# Patient Record
Sex: MALE | Race: AMERICAN INDIAN OR ALASKA NATIVE | NOT HISPANIC OR LATINO | URBAN - METROPOLITAN AREA
[De-identification: names, ages, dates, MRNs, and addresses within clinical notes are randomized per-mention and may not be internally consistent; named-entity substitution may affect disease eponyms.]

---

## 2017-02-27 ENCOUNTER — HOSPITAL ENCOUNTER (OUTPATIENT)
Dept: RADIOLOGY | Facility: MEDICAL CENTER | Age: 32
End: 2017-02-27

## 2017-02-27 ENCOUNTER — HOSPITAL ENCOUNTER (INPATIENT)
Facility: MEDICAL CENTER | Age: 32
LOS: 3 days | DRG: 133 | End: 2017-03-02
Attending: EMERGENCY MEDICINE | Admitting: INTERNAL MEDICINE
Payer: COMMERCIAL

## 2017-02-27 ENCOUNTER — APPOINTMENT (OUTPATIENT)
Dept: RADIOLOGY | Facility: MEDICAL CENTER | Age: 32
DRG: 133 | End: 2017-02-27
Attending: EMERGENCY MEDICINE
Payer: COMMERCIAL

## 2017-02-27 ENCOUNTER — RESOLUTE PROFESSIONAL BILLING HOSPITAL PROF FEE (OUTPATIENT)
Dept: OTHER | Facility: MEDICAL CENTER | Age: 32
End: 2017-02-27
Payer: COMMERCIAL

## 2017-02-27 DIAGNOSIS — J36 TONSILLAR ABSCESS: ICD-10-CM

## 2017-02-27 LAB
ALBUMIN SERPL BCP-MCNC: 3.4 G/DL (ref 3.2–4.9)
ALBUMIN/GLOB SERPL: 0.8 G/DL
ALP SERPL-CCNC: 84 U/L (ref 30–99)
ALT SERPL-CCNC: 82 U/L (ref 2–50)
ANION GAP SERPL CALC-SCNC: 10 MMOL/L (ref 0–11.9)
APTT PPP: 28.7 SEC (ref 24.7–36)
AST SERPL-CCNC: 35 U/L (ref 12–45)
BASE EXCESS BLDA CALC-SCNC: -4 MMOL/L (ref -4–3)
BASOPHILS # BLD AUTO: 0.1 % (ref 0–1.8)
BASOPHILS # BLD: 0.01 K/UL (ref 0–0.12)
BILIRUB SERPL-MCNC: 1.5 MG/DL (ref 0.1–1.5)
BODY TEMPERATURE: ABNORMAL DEGREES
BUN SERPL-MCNC: 11 MG/DL (ref 8–22)
CALCIUM SERPL-MCNC: 9 MG/DL (ref 8.5–10.5)
CHLORIDE SERPL-SCNC: 103 MMOL/L (ref 96–112)
CO2 BLDA-SCNC: 25 MMOL/L (ref 20–33)
CO2 SERPL-SCNC: 21 MMOL/L (ref 20–33)
CREAT SERPL-MCNC: 0.77 MG/DL (ref 0.5–1.4)
EKG IMPRESSION: NORMAL
EOSINOPHIL # BLD AUTO: 0 K/UL (ref 0–0.51)
EOSINOPHIL NFR BLD: 0 % (ref 0–6.9)
ERYTHROCYTE [DISTWIDTH] IN BLOOD BY AUTOMATED COUNT: 44.7 FL (ref 35.9–50)
GFR SERPL CREATININE-BSD FRML MDRD: >60 ML/MIN/1.73 M 2
GLOBULIN SER CALC-MCNC: 4.4 G/DL (ref 1.9–3.5)
GLUCOSE SERPL-MCNC: 145 MG/DL (ref 65–99)
GRAM STN SPEC: NORMAL
HCO3 BLDA-SCNC: 23.3 MMOL/L (ref 17–25)
HCT VFR BLD AUTO: 42.6 % (ref 42–52)
HGB BLD-MCNC: 13.7 G/DL (ref 14–18)
IMM GRANULOCYTES # BLD AUTO: 0.05 K/UL (ref 0–0.11)
IMM GRANULOCYTES NFR BLD AUTO: 0.5 % (ref 0–0.9)
INR PPP: 1.05 (ref 0.87–1.13)
LACTATE BLD-SCNC: 1.4 MMOL/L (ref 0.5–2)
LACTATE BLD-SCNC: 2.1 MMOL/L (ref 0.5–2)
LYMPHOCYTES # BLD AUTO: 0.76 K/UL (ref 1–4.8)
LYMPHOCYTES NFR BLD: 7.2 % (ref 22–41)
MCH RBC QN AUTO: 29 PG (ref 27–33)
MCHC RBC AUTO-ENTMCNC: 32.2 G/DL (ref 33.7–35.3)
MCV RBC AUTO: 90.1 FL (ref 81.4–97.8)
MONOCYTES # BLD AUTO: 0.2 K/UL (ref 0–0.85)
MONOCYTES NFR BLD AUTO: 1.9 % (ref 0–13.4)
NEUTROPHILS # BLD AUTO: 9.59 K/UL (ref 1.82–7.42)
NEUTROPHILS NFR BLD: 90.3 % (ref 44–72)
NRBC # BLD AUTO: 0 K/UL
NRBC BLD AUTO-RTO: 0 /100 WBC
O2/TOTAL GAS SETTING VFR VENT: 70 %
PCO2 BLDA: 48.6 MMHG (ref 26–37)
PH BLDA: 7.29 [PH] (ref 7.4–7.5)
PLATELET # BLD AUTO: 180 K/UL (ref 164–446)
PMV BLD AUTO: 11.2 FL (ref 9–12.9)
PO2 BLDA: 86 MMHG (ref 64–87)
POTASSIUM SERPL-SCNC: 4 MMOL/L (ref 3.6–5.5)
PROT SERPL-MCNC: 7.8 G/DL (ref 6–8.2)
PROTHROMBIN TIME: 14 SEC (ref 12–14.6)
RBC # BLD AUTO: 4.73 M/UL (ref 4.7–6.1)
SAO2 % BLDA: 95 % (ref 93–99)
SIGNIFICANT IND 70042: NORMAL
SITE SITE: NORMAL
SODIUM SERPL-SCNC: 134 MMOL/L (ref 135–145)
SOURCE SOURCE: NORMAL
SPECIMEN DRAWN FROM PATIENT: ABNORMAL
TRIGL SERPL-MCNC: 84 MG/DL (ref 0–149)
WBC # BLD AUTO: 10.6 K/UL (ref 4.8–10.8)

## 2017-02-27 PROCEDURE — 85610 PROTHROMBIN TIME: CPT

## 2017-02-27 PROCEDURE — 96375 TX/PRO/DX INJ NEW DRUG ADDON: CPT

## 2017-02-27 PROCEDURE — 700111 HCHG RX REV CODE 636 W/ 250 OVERRIDE (IP): Performed by: INTERNAL MEDICINE

## 2017-02-27 PROCEDURE — 700105 HCHG RX REV CODE 258: Performed by: STUDENT IN AN ORGANIZED HEALTH CARE EDUCATION/TRAINING PROGRAM

## 2017-02-27 PROCEDURE — 99291 CRITICAL CARE FIRST HOUR: CPT | Performed by: INTERNAL MEDICINE

## 2017-02-27 PROCEDURE — 36600 WITHDRAWAL OF ARTERIAL BLOOD: CPT

## 2017-02-27 PROCEDURE — 700101 HCHG RX REV CODE 250

## 2017-02-27 PROCEDURE — 85730 THROMBOPLASTIN TIME PARTIAL: CPT

## 2017-02-27 PROCEDURE — 5A1945Z RESPIRATORY VENTILATION, 24-96 CONSECUTIVE HOURS: ICD-10-PCS | Performed by: OTOLARYNGOLOGY

## 2017-02-27 PROCEDURE — 700112 HCHG RX REV CODE 229: Performed by: STUDENT IN AN ORGANIZED HEALTH CARE EDUCATION/TRAINING PROGRAM

## 2017-02-27 PROCEDURE — 94760 N-INVAS EAR/PLS OXIMETRY 1: CPT

## 2017-02-27 PROCEDURE — A9270 NON-COVERED ITEM OR SERVICE: HCPCS | Performed by: STUDENT IN AN ORGANIZED HEALTH CARE EDUCATION/TRAINING PROGRAM

## 2017-02-27 PROCEDURE — 96366 THER/PROPH/DIAG IV INF ADDON: CPT

## 2017-02-27 PROCEDURE — 87070 CULTURE OTHR SPECIMN AEROBIC: CPT

## 2017-02-27 PROCEDURE — 87077 CULTURE AEROBIC IDENTIFY: CPT

## 2017-02-27 PROCEDURE — 94640 AIRWAY INHALATION TREATMENT: CPT

## 2017-02-27 PROCEDURE — 700102 HCHG RX REV CODE 250 W/ 637 OVERRIDE(OP): Performed by: INTERNAL MEDICINE

## 2017-02-27 PROCEDURE — 700111 HCHG RX REV CODE 636 W/ 250 OVERRIDE (IP): Performed by: PHARMACIST

## 2017-02-27 PROCEDURE — 87040 BLOOD CULTURE FOR BACTERIA: CPT

## 2017-02-27 PROCEDURE — 700101 HCHG RX REV CODE 250: Performed by: INTERNAL MEDICINE

## 2017-02-27 PROCEDURE — 93005 ELECTROCARDIOGRAM TRACING: CPT | Performed by: EMERGENCY MEDICINE

## 2017-02-27 PROCEDURE — 770022 HCHG ROOM/CARE - ICU (200)

## 2017-02-27 PROCEDURE — 84478 ASSAY OF TRIGLYCERIDES: CPT

## 2017-02-27 PROCEDURE — 99291 CRITICAL CARE FIRST HOUR: CPT

## 2017-02-27 PROCEDURE — 82803 BLOOD GASES ANY COMBINATION: CPT

## 2017-02-27 PROCEDURE — 700102 HCHG RX REV CODE 250 W/ 637 OVERRIDE(OP): Performed by: STUDENT IN AN ORGANIZED HEALTH CARE EDUCATION/TRAINING PROGRAM

## 2017-02-27 PROCEDURE — 36415 COLL VENOUS BLD VENIPUNCTURE: CPT

## 2017-02-27 PROCEDURE — 87205 SMEAR GRAM STAIN: CPT

## 2017-02-27 PROCEDURE — 85025 COMPLETE CBC W/AUTO DIFF WBC: CPT

## 2017-02-27 PROCEDURE — 0C9P0ZZ DRAINAGE OF TONSILS, OPEN APPROACH: ICD-10-PCS | Performed by: OTOLARYNGOLOGY

## 2017-02-27 PROCEDURE — 80053 COMPREHEN METABOLIC PANEL: CPT

## 2017-02-27 PROCEDURE — 700111 HCHG RX REV CODE 636 W/ 250 OVERRIDE (IP): Performed by: EMERGENCY MEDICINE

## 2017-02-27 PROCEDURE — 71010 DX-CHEST-PORTABLE (1 VIEW): CPT

## 2017-02-27 PROCEDURE — 96365 THER/PROPH/DIAG IV INF INIT: CPT

## 2017-02-27 PROCEDURE — A9270 NON-COVERED ITEM OR SERVICE: HCPCS | Performed by: INTERNAL MEDICINE

## 2017-02-27 PROCEDURE — 700111 HCHG RX REV CODE 636 W/ 250 OVERRIDE (IP): Performed by: STUDENT IN AN ORGANIZED HEALTH CARE EDUCATION/TRAINING PROGRAM

## 2017-02-27 PROCEDURE — 96368 THER/DIAG CONCURRENT INF: CPT

## 2017-02-27 PROCEDURE — 94002 VENT MGMT INPAT INIT DAY: CPT

## 2017-02-27 PROCEDURE — 83605 ASSAY OF LACTIC ACID: CPT

## 2017-02-27 RX ORDER — LIDOCAINE HYDROCHLORIDE AND EPINEPHRINE BITARTRATE 20; .01 MG/ML; MG/ML
INJECTION, SOLUTION SUBCUTANEOUS
Status: COMPLETED
Start: 2017-02-27 | End: 2017-02-27

## 2017-02-27 RX ORDER — BISACODYL 10 MG
10 SUPPOSITORY, RECTAL RECTAL
Status: DISCONTINUED | OUTPATIENT
Start: 2017-02-27 | End: 2017-03-02 | Stop reason: HOSPADM

## 2017-02-27 RX ORDER — SODIUM CHLORIDE 9 MG/ML
INJECTION, SOLUTION INTRAVENOUS CONTINUOUS
Status: DISCONTINUED | OUTPATIENT
Start: 2017-02-27 | End: 2017-03-02 | Stop reason: HOSPADM

## 2017-02-27 RX ORDER — DOCUSATE SODIUM 100 MG/1
100 CAPSULE, LIQUID FILLED ORAL 2 TIMES DAILY
Status: DISCONTINUED | OUTPATIENT
Start: 2017-02-27 | End: 2017-03-02 | Stop reason: HOSPADM

## 2017-02-27 RX ORDER — AMOXICILLIN 250 MG
1 CAPSULE ORAL
Status: DISCONTINUED | OUTPATIENT
Start: 2017-02-27 | End: 2017-03-02 | Stop reason: HOSPADM

## 2017-02-27 RX ORDER — LIDOCAINE HYDROCHLORIDE 10 MG/ML
20 INJECTION, SOLUTION INFILTRATION; PERINEURAL ONCE
Status: COMPLETED | OUTPATIENT
Start: 2017-02-27 | End: 2017-02-27

## 2017-02-27 RX ORDER — HEPARIN SODIUM 5000 [USP'U]/ML
5000 INJECTION, SOLUTION INTRAVENOUS; SUBCUTANEOUS EVERY 8 HOURS
Status: DISCONTINUED | OUTPATIENT
Start: 2017-02-27 | End: 2017-03-02 | Stop reason: HOSPADM

## 2017-02-27 RX ORDER — LIDOCAINE HYDROCHLORIDE 10 MG/ML
1-2 INJECTION, SOLUTION INFILTRATION; PERINEURAL
Status: DISCONTINUED | OUTPATIENT
Start: 2017-02-27 | End: 2017-03-02 | Stop reason: HOSPADM

## 2017-02-27 RX ORDER — LIDOCAINE HYDROCHLORIDE AND EPINEPHRINE BITARTRATE 20; .01 MG/ML; MG/ML
10 INJECTION, SOLUTION SUBCUTANEOUS ONCE
Status: COMPLETED | OUTPATIENT
Start: 2017-02-27 | End: 2017-02-27

## 2017-02-27 RX ORDER — FAMOTIDINE 20 MG/1
20 TABLET, FILM COATED ORAL 2 TIMES DAILY
Status: DISCONTINUED | OUTPATIENT
Start: 2017-02-27 | End: 2017-03-02 | Stop reason: HOSPADM

## 2017-02-27 RX ORDER — ENEMA 19; 7 G/133ML; G/133ML
1 ENEMA RECTAL
Status: DISCONTINUED | OUTPATIENT
Start: 2017-02-27 | End: 2017-02-28

## 2017-02-27 RX ORDER — IPRATROPIUM BROMIDE AND ALBUTEROL SULFATE 2.5; .5 MG/3ML; MG/3ML
3 SOLUTION RESPIRATORY (INHALATION)
Status: DISCONTINUED | OUTPATIENT
Start: 2017-02-27 | End: 2017-02-28

## 2017-02-27 RX ORDER — LACTULOSE 20 G/30ML
30 SOLUTION ORAL
Status: DISCONTINUED | OUTPATIENT
Start: 2017-02-27 | End: 2017-02-28

## 2017-02-27 RX ORDER — CHLORHEXIDINE GLUCONATE ORAL RINSE 1.2 MG/ML
15 SOLUTION DENTAL 2 TIMES DAILY
Status: DISCONTINUED | OUTPATIENT
Start: 2017-02-27 | End: 2017-02-28

## 2017-02-27 RX ORDER — AMOXICILLIN 250 MG
1 CAPSULE ORAL NIGHTLY
Status: DISCONTINUED | OUTPATIENT
Start: 2017-02-27 | End: 2017-03-02 | Stop reason: HOSPADM

## 2017-02-27 RX ORDER — OMEPRAZOLE 20 MG/1
20 CAPSULE, DELAYED RELEASE ORAL DAILY
Status: ON HOLD | COMMUNITY
End: 2017-03-02

## 2017-02-27 RX ORDER — ENEMA 19; 7 G/133ML; G/133ML
1 ENEMA RECTAL
Status: DISCONTINUED | OUTPATIENT
Start: 2017-02-27 | End: 2017-03-02 | Stop reason: HOSPADM

## 2017-02-27 RX ORDER — LACTULOSE 20 G/30ML
30 SOLUTION ORAL
Status: DISCONTINUED | OUTPATIENT
Start: 2017-02-27 | End: 2017-03-02 | Stop reason: HOSPADM

## 2017-02-27 RX ORDER — LABETALOL HYDROCHLORIDE 5 MG/ML
10 INJECTION, SOLUTION INTRAVENOUS EVERY 4 HOURS PRN
Status: DISCONTINUED | OUTPATIENT
Start: 2017-02-27 | End: 2017-02-28

## 2017-02-27 RX ORDER — AMPICILLIN AND SULBACTAM 2; 1 G/1; G/1
3 INJECTION, POWDER, FOR SOLUTION INTRAMUSCULAR; INTRAVENOUS ONCE
Status: COMPLETED | OUTPATIENT
Start: 2017-02-27 | End: 2017-02-27

## 2017-02-27 RX ORDER — IPRATROPIUM BROMIDE AND ALBUTEROL SULFATE 2.5; .5 MG/3ML; MG/3ML
3 SOLUTION RESPIRATORY (INHALATION)
Status: DISCONTINUED | OUTPATIENT
Start: 2017-02-27 | End: 2017-03-02 | Stop reason: HOSPADM

## 2017-02-27 RX ORDER — LIDOCAINE HYDROCHLORIDE AND EPINEPHRINE 10; 10 MG/ML; UG/ML
20 INJECTION, SOLUTION INFILTRATION; PERINEURAL ONCE
Status: DISCONTINUED | OUTPATIENT
Start: 2017-02-27 | End: 2017-02-27

## 2017-02-27 RX ADMIN — STANDARDIZED SENNA CONCENTRATE AND DOCUSATE SODIUM 1 TABLET: 8.6; 5 TABLET, FILM COATED ORAL at 21:49

## 2017-02-27 RX ADMIN — SODIUM CHLORIDE: 9 INJECTION, SOLUTION INTRAVENOUS at 16:12

## 2017-02-27 RX ADMIN — PROPOFOL 70 MCG/KG/MIN: 10 INJECTION, EMULSION INTRAVENOUS at 21:59

## 2017-02-27 RX ADMIN — FENTANYL CITRATE 100 MCG: 50 INJECTION, SOLUTION INTRAMUSCULAR; INTRAVENOUS at 18:12

## 2017-02-27 RX ADMIN — LIDOCAINE HYDROCHLORIDE AND EPINEPHRINE: 20; 10 INJECTION, SOLUTION INFILTRATION; PERINEURAL at 06:15

## 2017-02-27 RX ADMIN — FENTANYL CITRATE 100 MCG: 50 INJECTION, SOLUTION INTRAMUSCULAR; INTRAVENOUS at 06:02

## 2017-02-27 RX ADMIN — DOCUSATE SODIUM 100 MG: 100 CAPSULE ORAL at 21:49

## 2017-02-27 RX ADMIN — FENTANYL CITRATE 100 MCG: 50 INJECTION, SOLUTION INTRAMUSCULAR; INTRAVENOUS at 21:49

## 2017-02-27 RX ADMIN — IPRATROPIUM BROMIDE AND ALBUTEROL SULFATE 3 ML: .5; 3 SOLUTION RESPIRATORY (INHALATION) at 14:21

## 2017-02-27 RX ADMIN — AMPICILLIN SODIUM AND SULBACTAM SODIUM 3 G: 2; 1 INJECTION, POWDER, FOR SOLUTION INTRAMUSCULAR; INTRAVENOUS at 18:12

## 2017-02-27 RX ADMIN — PROPOFOL 70 MCG/KG/MIN: 10 INJECTION, EMULSION INTRAVENOUS at 20:24

## 2017-02-27 RX ADMIN — HEPARIN SODIUM 5000 UNITS: 5000 INJECTION, SOLUTION INTRAVENOUS; SUBCUTANEOUS at 21:49

## 2017-02-27 RX ADMIN — IPRATROPIUM BROMIDE AND ALBUTEROL SULFATE 3 ML: .5; 3 SOLUTION RESPIRATORY (INHALATION) at 22:55

## 2017-02-27 RX ADMIN — CHLORHEXIDINE GLUCONATE 15 ML: 1.2 RINSE ORAL at 21:49

## 2017-02-27 RX ADMIN — FAMOTIDINE 20 MG: 20 TABLET, FILM COATED ORAL at 12:59

## 2017-02-27 RX ADMIN — PROPOFOL 80 MCG/KG/MIN: 10 INJECTION, EMULSION INTRAVENOUS at 08:05

## 2017-02-27 RX ADMIN — PROPOFOL 40 MCG/KG/MIN: 10 INJECTION, EMULSION INTRAVENOUS at 18:22

## 2017-02-27 RX ADMIN — FENTANYL CITRATE 100 MCG: 50 INJECTION, SOLUTION INTRAMUSCULAR; INTRAVENOUS at 19:31

## 2017-02-27 RX ADMIN — AMPICILLIN SODIUM AND SULBACTAM SODIUM 3 G: 2; 1 INJECTION, POWDER, FOR SOLUTION INTRAMUSCULAR; INTRAVENOUS at 12:34

## 2017-02-27 RX ADMIN — SODIUM CHLORIDE: 9 INJECTION, SOLUTION INTRAVENOUS at 08:45

## 2017-02-27 RX ADMIN — PROPOFOL 80 MCG/KG/MIN: 10 INJECTION, EMULSION INTRAVENOUS at 16:10

## 2017-02-27 RX ADMIN — CHLORHEXIDINE GLUCONATE 15 ML: 1.2 RINSE ORAL at 08:45

## 2017-02-27 RX ADMIN — PROPOFOL 70 MCG/KG/MIN: 10 INJECTION, EMULSION INTRAVENOUS at 23:45

## 2017-02-27 RX ADMIN — HEPARIN SODIUM 5000 UNITS: 5000 INJECTION, SOLUTION INTRAVENOUS; SUBCUTANEOUS at 13:05

## 2017-02-27 RX ADMIN — PROPOFOL 20 MCG/KG/MIN: 10 INJECTION, EMULSION INTRAVENOUS at 13:42

## 2017-02-27 RX ADMIN — IPRATROPIUM BROMIDE AND ALBUTEROL SULFATE 3 ML: .5; 3 SOLUTION RESPIRATORY (INHALATION) at 10:51

## 2017-02-27 RX ADMIN — FENTANYL CITRATE 100 MCG: 50 INJECTION, SOLUTION INTRAMUSCULAR; INTRAVENOUS at 08:05

## 2017-02-27 RX ADMIN — PROPOFOL 20 MCG/KG/MIN: 10 INJECTION, EMULSION INTRAVENOUS at 04:51

## 2017-02-27 RX ADMIN — FAMOTIDINE 20 MG: 20 TABLET, FILM COATED ORAL at 21:49

## 2017-02-27 RX ADMIN — PROPOFOL 30 MCG/KG/MIN: 10 INJECTION, EMULSION INTRAVENOUS at 11:30

## 2017-02-27 RX ADMIN — FENTANYL CITRATE 100 MCG: 50 INJECTION, SOLUTION INTRAMUSCULAR; INTRAVENOUS at 16:10

## 2017-02-27 RX ADMIN — IPRATROPIUM BROMIDE AND ALBUTEROL SULFATE 3 ML: .5; 3 SOLUTION RESPIRATORY (INHALATION) at 19:33

## 2017-02-27 RX ADMIN — AMPICILLIN AND SULBACTAM 3 G: 2; 1 INJECTION, POWDER, FOR SOLUTION INTRAMUSCULAR; INTRAVENOUS at 05:16

## 2017-02-27 ASSESSMENT — LIFESTYLE VARIABLES: REASON UNABLE TO ASSESS: INTUBATED, SEDATED

## 2017-02-27 NOTE — IP AVS SNAPSHOT
3/2/2017          Kurt Freeman  [address]  [place] [address]    Dear Kurt:    [facility] wants to ensure your discharge home is safe and you or your loved ones have had all your questions answered regarding your care after you leave the hospital.    You may receive a telephone call within two days of your discharge.  This call is to make certain you understand your discharge instructions as well as ensure we provided you with the best care possible during your stay with us.     The call will only last approximately 3-5 minutes and will be done by a nurse.    Once again, we want to ensure your discharge home is safe and that you have a clear understanding of any next steps in your care.  If you have any questions or concerns, please do not hesitate to contact us, we are here for you.  Thank you for choosing [facility] for your healthcare needs.    Sincerely,    Sathya Perez    [facility]

## 2017-02-27 NOTE — DISCHARGE PLANNING
Medical Social Work    Referral: Critical Patient    Intervention: MSW responded to RD07 for a critical pt.  Pt is Kurt Freeman (: 1985) a 31 year old male brought in by [facility] from [facility] intubated.  Per flight crew pt's mom, Jenelle Rausch ([phone]) is on her way.  Pt's wife, Carmela can be reached at: [phone].  MSW spoke with pt's wife, Carmela who states that she was unable to travel to [place] as they have a family member who was given a few days to a few weeks to live and she has to be home with him.  MSW attempted to contact pt's mother; however, there was no answer and MSW was unable to leave a voice message.     Plan: SW will continue to follow as needed.

## 2017-02-27 NOTE — CONSULTS
DATE OF SERVICE:  02/27/2017    PRINCIPAL COMPLAINT:  Peritonsillar abscess.    HISTORY OF PRESENT ILLNESS:  This is a 31-year-old male who was seen at [facility]   for complaint of several days of pain and difficulty swallowing.  He was   scanned at [facility] where there was concern for retropharyngeal abscess as well   as a peritonsillar abscess.    PAST MEDICAL HISTORY:  At this time is unobtainable secondary to him being   intubated and sedated.    ALLERGIES:  He has no known drug allergies.    PHYSICAL EXAMINATION:  VITAL SIGNS:  He is a large man with big neck, intubated and ventilated.  HEENT:  His ears are within normal limits.  The ear canals are clear with   intact eardrum, no fluid.  The nose has some dried blood and he is intubated   at this time with endotracheal tube secured in place.  The mouth has a lot of   secretions.  Inspection of the mouth, I am actually easily able to see the   oropharynx, which shows no protrusion or abnormality, although he does have a   huge mouth and tongue just given his size.  His tonsils are prominent, but his   posterior pharynx is patent.  I could see the uvula well.  He does have a   little more prominence of the left peritonsillar area than the right.  NECK:  Full, secondary to his weight, but there is no erythema or fluctuance,   I do not feel any discrete lymph nodes, but he is notably overweight.    IMAGING:  CT scan was reviewed that shows enlarged tonsils bilaterally.  He has a   hypoattenuation along the left peritonsillar region.  There is some concern of   some fluid against the level of C1-2, but the CT scan of the retropharyngeal   area looks pretty normal.  There may be some fluid collection at the level of   the just above the thyroid cartilage, but this does not look concerning.  The   CT scan was done with contrast without intubation.    IMPRESSION:  Peritonsillar abscess.  I did inject lidocaine into the left peritonsillar are in the ER, incised and open the  area.  I got very   little pus, but did culture this .  He should be fine on Unasyn.  Most   likely, he can be extubated tomorrow and can go home within the next day or 2.    IMPRESSION:  Peritonsillar abscess, status post incision and drainage.       ____________________________________     MD SERINA Dillon / YANA    DD:  02/27/2017 06:17:45  DT:  02/27/2017 06:42:51    D#:  [id number]  Job#:  [id number]

## 2017-02-27 NOTE — DISCHARGE PLANNING
Care Transition Team Assessment    Information for this assessment was gathered from; this SW, the pt's sister, the bedside RN, and the pt chart. The pt lives in a home in [facility] with his mother and niece. The pt receives medical services through [facility] [employer]. 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 Pt's mother and sister are currently in [place] and will remain here until the pt is discharged from the hospital. Pt sister is the  for transport back to [place]. Pt is not  and does not have any children. Pt was independent with ADLs prior to admission.      Information Source  Orientation : Unable to Assess  Information Given By: Relative  Informant's Name: Dora Freeman  Who is responsible for making decisions for patient? : Patient    Readmission Evaluation  Is this a readmission?: No    Elopement Risk  Legal Hold: No  Ambulatory or Self Mobile in Wheelchair: No-Not an Elopement Risk  Elopement Risk: Not at Risk for Elopement         Discharge Preparedness  What is your plan after discharge?: Home with help  What are your discharge supports?: Parent  Prior Functional Level: Ambulatory, Drives Self, Independent with Activities of Daily Living  Difficulity with ADLs: None  Difficulity with IADLs: None    Functional Assesment  Prior Functional Level: Ambulatory, Drives Self, Independent with Activities of Daily Living    Finances  Financial Barriers to Discharge: No  Prescription Coverage: Yes              Advance Directive  Advance Directive?: None, Clinically incapacitated / Inappropriate         Psychological Assessment  History of Substance Abuse: Alcohol  Date Last Used - Alcohol: weekly  Substance Abuse Comments: Pt is a social drinker. consumes alcohol about 2 times a week.   History of Psychiatric Problems: No  Non-compliant with Treatment: No  Newly Diagnosed Illness: No    Discharge Risks or Barriers  Discharge risks or barriers?:  Transportation, Post-acute placement / services    Anticipated Discharge Information  Anticipated discharge disposition: Home  Discharge Address: [address] [place] [id number]  Discharge Contact Phone Number: [phone] (Sister)

## 2017-02-27 NOTE — PROGRESS NOTES
ICU Gold Team Progress Note               Attending/Resident:   Dr. Leonard/ Michela Date & Time created: 2/27/2017  2:22 PM     Interval History:  30 y/o M with no significant PMHx was transferred from OSH for possible retropharyngeal abscess    2/27:  Intubated at OSH for airway protection for concern of retropharyngeal abscess            CT done at OSH.              ENT Dr. Ann was consulted and patient was found to have      Review of Systems:  Review of Systems   Unable to perform ROS: intubated       Physical Exam:  Physical Exam   Constitutional: He appears well-developed and well-nourished.   Intubated, Sedated.    HENT:   Head: Normocephalic and atraumatic.   Eyes:   Pin point pupil.  Not reactive.    Neck: No JVD present.   Cardiovascular: Normal rate, regular rhythm and intact distal pulses.  Exam reveals friction rub. Exam reveals no gallop.    No murmur heard.  Pulmonary/Chest: Breath sounds normal. No stridor. He has no wheezes. He has no rales.   Intubated   Abdominal: Soft. Bowel sounds are normal. He exhibits no distension. There is no rebound and no guarding.   Musculoskeletal: Normal range of motion. He exhibits no edema or tenderness.   Lymphadenopathy:     He has no cervical adenopathy.   Neurological:   Sedated   Skin: Skin is warm and dry. No rash noted. No erythema. No pallor.       Labs:  Recent Labs      02/27/17   0508   ISTATAPH  7.288*   ISTATAPCO2  48.6*   ISTATAPO2  86   ISTATATCO2  25   FFICEAO8YYS  95   ISTATARTHCO3  23.3   ISTATARTBE  -4   ISTATTEMP  see below   ISTATFIO2  70   ISTATSPEC  Arterial         Recent Labs      02/27/17   0445   SODIUM  134*   POTASSIUM  4.0   CHLORIDE  103   CO2  21   BUN  11   CREATININE  0.77   CALCIUM  9.0     Recent Labs      02/27/17   0445   ALTSGPT  82*   ASTSGOT  35   ALKPHOSPHAT  84   TBILIRUBIN  1.5   GLUCOSE  145*     Recent Labs      02/27/17   0445   RBC  4.73   HEMOGLOBIN  13.7*   HEMATOCRIT  42.6   PLATELETCT  180   PROTHROMBTM  14.0    APTT  28.7   INR  1.05     Recent Labs      17   0445   WBC  10.6   NEUTSPOLYS  90.30*   LYMPHOCYTES  7.20*   MONOCYTES  1.90   EOSINOPHILS  0.00   BASOPHILS  0.10   ASTSGOT  35   ALTSGPT  82*   ALKPHOSPHAT  84   TBILIRUBIN  1.5           Hemodynamics:  Temp (24hrs), Av.4 °C (97.5 °F), Min:36 °C (96.8 °F), Max:36.9 °C (98.5 °F)  Temperature: 36 °C (96.8 °F)  Pulse  Av.9  Min: 67  Max: 86Heart Rate (Monitored): 81  Blood Pressure: 124/69 mmHg, NIBP: (!) 97/54 mmHg     Respiratory:  Melo Vent Mode: APVCMV, Rate (breaths/min): 18, PEEP/CPAP: 8, FiO2: 50, P Peak (PIP): 24, P MEAN: 12 Respiration: (!) 123, Pulse Oximetry: 99 %     Work Of Breathing / Effort: Vented  RUL Breath Sounds: Fine Crackles, RML Breath Sounds: Diminished;Fine Crackles, RLL Breath Sounds: Diminished, SOULEYMANE Breath Sounds: Fine Crackles, LLL Breath Sounds: Diminished  Fluids:    Intake/Output Summary (Last 24 hours) at 17 1422  Last data filed at 17 1400   Gross per 24 hour   Intake 1135.55 ml   Output   1325 ml   Net -189.45 ml     Weight: (!) 145.8 kg (321 lb 6.9 oz)  GI/Nutrition:  Orders Placed This Encounter   Procedures   • Diet NPO     Standing Status: Standing      Number of Occurrences: 1      Standing Expiration Date:      Order Specific Question:  Type:     Answer:  Now [1]     Order Specific Question:  Restrict to:     Answer:  Strict [1]     Medical Decision Making, by Problem:  Active Hospital Problems    Diagnosis   • Tonsillar abscess [J36]      Tonsillar Abscess  -Transferred from OSH with intubated  -ENT on board.  S/p ID of tonsillar abscess  -Cx pending  -Lactic acid trending down 2.1-> 1.4. Gram stain: Gram positive cocci  -Bcx pending  -Con't Unasyn.    -Con't Vent  -Will plan to extubate  -CTM    Respiratory   -On Ventilation for airway protection   -Con't Ventilation and sedation  - Plan to extubate tomorrow    ETOH use  -Sedated.   -Will monitor for withdrawal    Transaminitis  -AST 35, ALT  82  -pattern not c/w ETOH use  -?Viral hepatitis vs. NAFLD        Labs reviewed, Radiology images reviewed and Medications reviewed  Armendariz catheter: Critically Ill - Requiring Accurate Measurement of Urinary Output        DVT prophylaxis - mechanical: SCDs

## 2017-02-27 NOTE — ED PROVIDER NOTES
"ED Provider Note    ED Provider Note        CHIEF COMPLAINT  Chief Complaint   Patient presents with   • Abscess   • Airway Obstruction       HPI  Kurt Freeman is a 31 y.o. male who presents to the Emergency Department transferred from [facility] with chief concern of peritonsillar abscess and retropharyngeal abscess. Patient presented to the outside hospital with difficulty swallowing and increased throat pain. Chills and fever as reported by outside physician. CT scan at the outside hospital was concerning for 2.5 cm left-sided peritonsillar abscess as well as a 0.8 cm retropharyngeal abscess from C2-C4. Patient was given IV Decadron and clindamycin at the outside hospital. With concerns of airway compromise and long transport patient was intubated for airway protection. He arrives here intubated and fully sedated further history of present illness limited by presentation.    REVIEW OF SYSTEMS  As above otherwise limited by presentation intubation and sedation    PAST MEDICAL HISTORY    unable to obtain    SURGICAL HISTORY  Unable to obtain    SOCIAL HISTORY  Unable to obtain      FAMILY HISTORY  Non-Contributory    CURRENT MEDICATIONS  Home Medications     Reviewed by Ezequile Alarcon (Pharmacy Tech) on 02/27/17 at 0734  Med List Status: Complete    Medication Last Dose Status    omeprazole (PRILOSEC) 20 MG delayed-release capsule 2/25/2017 Active                ALLERGIES  No Known Allergies    PHYSICAL EXAM  VITAL SIGNS: /69 mmHg  Pulse 84  Temp(Src) 36.9 °C (98.5 °F)  Resp 19  Ht 1.88 m (6' 2\")  Wt 144.697 kg (319 lb)  BMI 40.94 kg/m2  SpO2 96%  Pulse ox interpretation: I interpret this pulse ox as normal.  Constitutional: Intubated and sedated  HEENT: Atraumatic normocephalic, pupils are equal round reactive to light  The nares is clear, external ears are normal, mouth shows moist mucous membranes endotracheal tube in place  Neck: Morbidly obese palpable left-sided " cervical lymphadenopathy  Cardiovascular: Regular rate and rhythm no murmur rub or gallop 2+ pulses peripherally x4  Thorax & Lungs: Mechanically ventilated 9 min tissue sounds appreciable over mechanical ventilation.   GI: Morbidly obese nondistended positive bowel sounds  Skin: Warm dry no acute rash or lesion  Musculoskeletal: no acute  deformity  Neurologic: Intubated and sedated  Psychiatric: Intubated and sedated      DIAGNOSTIC STUDIES / PROCEDURES  LABS  Results for orders placed or performed during the hospital encounter of 02/27/17   CBC WITH DIFFERENTIAL   Result Value Ref Range    WBC 10.6 4.8 - 10.8 K/uL    RBC 4.73 4.70 - 6.10 M/uL    Hemoglobin 13.7 (L) 14.0 - 18.0 g/dL    Hematocrit 42.6 42.0 - 52.0 %    MCV 90.1 81.4 - 97.8 fL    MCH 29.0 27.0 - 33.0 pg    MCHC 32.2 (L) 33.7 - 35.3 g/dL    RDW 44.7 35.9 - 50.0 fL    Platelet Count 180 164 - 446 K/uL    MPV 11.2 9.0 - 12.9 fL    Neutrophils-Polys 90.30 (H) 44.00 - 72.00 %    Lymphocytes 7.20 (L) 22.00 - 41.00 %    Monocytes 1.90 0.00 - 13.40 %    Eosinophils 0.00 0.00 - 6.90 %    Basophils 0.10 0.00 - 1.80 %    Immature Granulocytes 0.50 0.00 - 0.90 %    Nucleated RBC 0.00 /100 WBC    Neutrophils (Absolute) 9.59 (H) 1.82 - 7.42 K/uL    Lymphs (Absolute) 0.76 (L) 1.00 - 4.80 K/uL    Monos (Absolute) 0.20 0.00 - 0.85 K/uL    Eos (Absolute) 0.00 0.00 - 0.51 K/uL    Baso (Absolute) 0.01 0.00 - 0.12 K/uL    Immature Granulocytes (abs) 0.05 0.00 - 0.11 K/uL    NRBC (Absolute) 0.00 K/uL   COMP METABOLIC PANEL   Result Value Ref Range    Sodium 134 (L) 135 - 145 mmol/L    Potassium 4.0 3.6 - 5.5 mmol/L    Chloride 103 96 - 112 mmol/L    Co2 21 20 - 33 mmol/L    Anion Gap 10.0 0.0 - 11.9    Glucose 145 (H) 65 - 99 mg/dL    Bun 11 8 - 22 mg/dL    Creatinine 0.77 0.50 - 1.40 mg/dL    Calcium 9.0 8.5 - 10.5 mg/dL    AST(SGOT) 35 12 - 45 U/L    ALT(SGPT) 82 (H) 2 - 50 U/L    Alkaline Phosphatase 84 30 - 99 U/L    Total Bilirubin 1.5 0.1 - 1.5 mg/dL    Albumin 3.4  3.2 - 4.9 g/dL    Total Protein 7.8 6.0 - 8.2 g/dL    Globulin 4.4 (H) 1.9 - 3.5 g/dL    A-G Ratio 0.8 g/dL   APTT   Result Value Ref Range    APTT 28.7 24.7 - 36.0 sec   PROTHROMBIN TIME   Result Value Ref Range    PT 14.0 12.0 - 14.6 sec    INR 1.05 0.87 - 1.13   LACTIC ACID   Result Value Ref Range    Lactic Acid 2.1 (H) 0.5 - 2.0 mmol/L   TRIGLYCERIDE   Result Value Ref Range    Triglycerides 84 0 - 149 mg/dL   ESTIMATED GFR   Result Value Ref Range    GFR If African American >60 >60 mL/min/1.73 m 2    GFR If Non African American >60 >60 mL/min/1.73 m 2   EKG (NOW)   Result Value Ref Range    Report       [facility] Emergency Dept.    Test Date:  2017  Pt Name:    BISHOP UMAÑA                 Department: ER  MRN:        [id number]                      Room:       [facility]  Gender:     M                            Technician: [id number]  :        1985                   Requested By:TORO RAPP  Order #:    [id number]                    Reading MD:    Measurements  Intervals                                Axis  Rate:       80                           P:          16  GA:         152                          QRS:        48  QRSD:       88                           T:          18  QT:         396  QTc:        457    Interpretive Statements  SINUS RHYTHM  No previous ECG available for comparison     ISTAT ARTERIAL BLOOD GAS   Result Value Ref Range    Ph 7.288 (LL) 7.400 - 7.500    Pco2 48.6 (H) 26.0 - 37.0 mmHg    Po2 86 64 - 87 mmHg    Tco2 25 20 - 33 mmol/L    S02 95 93 - 99 %    Hco3 23.3 17.0 - 25.0 mmol/L    BE -4 -4 - 3 mmol/L    Body Temp see below degrees    O2 Therapy 70 %    Specimen Arterial        All labs reviewed by me.        RADIOLOGY  DX-CHEST-PORTABLE (1 VIEW)   Final Result      1.  ETT tip projects in satisfactory position.      2.  Mild central pulmonary vascular congestion.      3.  No lobar consolidation or pneumothorax.      CT-FOREIGN FILM CAT SCAN   Preliminary  Result      OUTSIDE IMAGES-DX CHEST   Preliminary Result        The radiologist's interpretation of all radiological studies have been reviewed by me.    COURSE & MEDICAL DECISION MAKING  Pertinent Labs & Imaging studies reviewed. (See chart for details)        Medical Decision Makin-year-old male transferred also for concerns of peritonsillar abscess and retropharyngeal abscess properly covered with antibiotics prior to arrival. This was broadened with Unasyn at arrival here chest x-ray shows ET tube in appropriate position other acute cardiopulmonary process. Repeat labs were sent as above. Patient was placed on propofol drip. Does have minimal respiratory acidosis and minimal lactic acidosis. Labs otherwise fairly unremarkable. Ear nose and throat surgeon Dr. Ann was consulted. Her help is greatly appreciated G was quickly at the patient's bedside and drained the peritonsillar abscess. After review of CAT scan she is not concern for retropharyngeal abscess. Patient will be admitted to the ICU for further evaluation and treatment admitted in guarded condition      FINAL IMPRESSION  1. Tonsillar abscess     2. Respiratory compromise  3. Respiratory acidosis  2. Lactic acidosis      This dictation has been created using voice recognition software and/or scribes. The accuracy of the dictation is limited by the abilities of the software and the expertise of the scribes. I expect there may be some errors of grammar and possibly content. I made every attempt to manually correct the errors within my dictation. However, errors related to voice recognition software and/or scribes may still exist and should be interpreted within the appropriate context.

## 2017-02-27 NOTE — CARE PLAN
Problem: Respiratory:  Goal: Respiratory status will improve  HOB>30 degrees. Oral care provided Q4 hours. RT actively titrating down FiO2. Collaborating with RT to ensure optimal oxygenation.    Problem: Skin Integrity  Goal: Risk for impaired skin integrity will decrease  Alex score 12. Pillows in place for support and positioning. Q2 hour turns in progress. Head-to-toe skin assessment completed. No new s/sx of skin breakdown.

## 2017-02-27 NOTE — ED NOTES
Med Rec completed per family at bedside  Allergies reviewed  No ORAL antibiotics in last 30 days

## 2017-02-27 NOTE — IP AVS SNAPSHOT
" <p align=\"LEFT\"><IMG SRC=\"//[email or web address]\" alt=\"Image\" WIDTH=\"50%\" HEIGHT=\"200\" BORDER=\"\"></p>                   Name:Kurt Freeman  Medical Record Number:[id number]  CSN: [id number]    YOB: 1985   Age: 31 y.o.  Sex: male  HT:1.88 m (6' 2\") WT: 145.8 kg (321 lb 6.9 oz)          Admit Date: 2/27/2017     Discharge Date:   Today's Date: 3/2/2017  Attending Doctor:  Unr Purple Team Bronander                  Allergies:  Review of patient's allergies indicates no known allergies.          Follow-up Information     1. Schedule an appointment as soon as possible for a visit with Ashley Ann M.D..    Specialty:  Otolaryngology    Contact information    [address] [place] [id number]  [phone]           Medication List      Take these Medications        Instructions    amoxicillin-clavulanate 875-125 MG Tabs   Commonly known as:  AUGMENTIN    Take 1 Tab by mouth 2 times a day for 7 days.   Dose:  1 Tab       ibuprofen 600 MG Tabs   Commonly known as:  MOTRIN    Take 1 Tab by mouth every 6 hours as needed for Mild Pain or Moderate Pain.   Dose:  600 mg       oxycodone immediate-release 5 MG Tabs   Commonly known as:  ROXICODONE    Take 1 Tab by mouth every 6 hours as needed for Severe Pain for up to 3 days.   Dose:  5 mg         "

## 2017-02-27 NOTE — DISCHARGE PLANNING
Generated Work Excuse Letter for pt's sister. Placed copy in pt chart. Paced letter on pt chart. Sister will  letter later on today.

## 2017-02-27 NOTE — ED NOTES
Assumed pt care. Pt restless on gurney. Pts sedation increased. Family updated on POC. Awaiting ICU

## 2017-02-27 NOTE — DIETARY
NUTRITION - Order received for Metabolic Cart Study per Dietitian/RT. Study not indicated at this time. Dietitian/RT to order as needed.

## 2017-02-27 NOTE — IP AVS SNAPSHOT
" Home Care Instructions                                                                                                                  Name:Kurt Freeman  Medical Record Number:[id number]  CSN: [id number]    YOB: 1985   Age: 31 y.o.  Sex: male  HT:1.88 m (6' 2\") WT: 145.8 kg (321 lb 6.9 oz)          Admit Date: 2/27/2017     Discharge Date:   Today's Date: 3/2/2017  Attending Doctor:  Unr Purple Team Bronander                  Allergies:  Review of patient's allergies indicates no known allergies.            Discharge Instructions       Discharge Instructions    Discharged to home by car with relative. Discharged via wheelchair, hospital escort: Yes.  Special equipment needed: Not Applicable    Be sure to schedule a follow-up appointment with your primary care doctor or any specialists as instructed.     Discharge Plan:   Diet Plan: Discussed  Activity Level: Discussed  Smoking Cessation Offered: Patient Counseled  Confirmed Follow up Appointment: Patient to Call and Schedule Appointment  Confirmed Symptoms Management: Discussed  Medication Reconciliation Updated: Yes  Influenza Vaccine Indication: Patient Refuses    I understand that a diet low in cholesterol, fat, and sodium is recommended for good health. Unless I have been given specific instructions below for another diet, I accept this instruction as my diet prescription.   Other diet: regular diet    Special Instructions: None    · Is patient discharged on Warfarin / Coumadin?   No     · Is patient Post Blood Transfusion?  No    Depression / Suicide Risk    As you are discharged from this [facility], it is important to learn how to keep safe from harming yourself.    Recognize the warning signs:  · Abrupt changes in personality, positive or negative- including increase in energy   · Giving away possessions  · Change in eating patterns- significant weight changes-  positive or negative  · Change in sleeping patterns- unable to sleep " or sleeping all the time   · Unwillingness or inability to communicate  · Depression  · Unusual sadness, discouragement and loneliness  · Talk of wanting to die  · Neglect of personal appearance   · Rebelliousness- reckless behavior  · Withdrawal from people/activities they love  · Confusion- inability to concentrate     If you or a loved one observes any of these behaviors or has concerns about self-harm, here's what you can do:  · Talk about it- your feelings and reasons for harming yourself  · Remove any means that you might use to hurt yourself (examples: pills, rope, extension cords, firearm)  · Get professional help from the community (Mental Health, Substance Abuse, psychological counseling)  · Do not be alone:Call your Safe Contact- someone whom you trust who will be there for you.  · Call your local CRISIS HOTLINE 676-1504 or 175-013-2340  · Call your local Children's Mobile Crisis Response Team [place] (597) 347-2285 or www.[employer]  · Call the toll free National Suicide Prevention Hotlines   · National Suicide Prevention Lifeline 649-370-CLDK (3486)  · National Hope Line Network 800-SUICIDE (730-9251)        Follow-up Information     1. Schedule an appointment as soon as possible for a visit with Ashley Ann M.D..    Specialty:  Otolaryngology    Contact information    [address] [place] [id number]  [phone]           Discharge Medication Instructions:    Below are the medications your physician expects you to take upon discharge:    Review all your home medications and newly ordered medications with your doctor and/or pharmacist. Follow medication instructions as directed by your doctor and/or pharmacist.    Please keep your medication list with you and share with your physician.               Medication List      START taking these medications        Instructions    amoxicillin-clavulanate 875-125 MG Tabs   Commonly known as:  AUGMENTIN    Take 1 Tab by mouth 2 times a day for 7  days.   Dose:  1 Tab       ibuprofen 600 MG Tabs   Commonly known as:  MOTRIN    Take 1 Tab by mouth every 6 hours as needed for Mild Pain or Moderate Pain.   Dose:  600 mg       oxycodone immediate-release 5 MG Tabs   Last time this was given:  5 mg on 3/2/2017  5:43 AM   Commonly known as:  ROXICODONE    Take 1 Tab by mouth every 6 hours as needed for Severe Pain for up to 3 days.   Dose:  5 mg         STOP taking these medications     omeprazole 20 MG delayed-release capsule   Commonly known as:  PRILOSEC               Instructions           Diet / Nutrition:    Follow any diet instructions given to you by your doctor or the dietician, including how much salt (sodium) you are allowed each day.    If you are overweight, talk to your doctor about a weight reduction plan.    Activity:    Remain physically active following your doctor's instructions about exercise and activity.    Rest often.     Any time you become even a little tired or short of breath, SIT DOWN and rest.    Worsening Symptoms:    Report any of the following signs and symptoms to the doctor's office immediately:    *Pain of jaw, arm, or neck  *Chest pain not relieved by medication                               *Dizziness or loss of consciousness  *Difficulty breathing even when at rest   *More tired than usual                                       *Bleeding drainage or swelling of surgical site  *Swelling of feet, ankles, legs or stomach                 *Fever (>100ºF)  *Pink or blood tinged sputum  *Weight gain (3lbs/day or 5lbs /week)           *Shock from internal defibrillator (if applicable)  *Palpitations or irregular heartbeats                *Cool and/or numb extremities    Stroke Awareness    Common Risk Factors for Stroke include:    Age  Atrial Fibrillation  Carotid Artery Stenosis  Diabetes Mellitus  Excessive alcohol consumption  High blood pressure  Overweight   Physical inactivity  Smoking    Warning signs and symptoms of a stroke  include:    *Sudden numbness or weakness of the face, arm or leg (especially on one side of the body).  *Sudden confusion, trouble speaking or understanding.  *Sudden trouble seeing in one or both eyes.  *Sudden trouble walking, dizziness, loss of balance or coordination.Sudden severe headache with no known cause.    It is very important to get treatment quickly when a stroke occurs. If you experience any of the above warning signs, call 911 immediately.                   Disclaimer         Quit Smoking / Tobacco Use:    I understand the use of any tobacco products increases my chance of suffering from future heart disease or stroke and could cause other illnesses which may shorten my life. Quitting the use of tobacco products is the single most important thing I can do to improve my health. For further information on smoking / tobacco cessation call a Toll Free Quit Line at 1-362.786.1458 (*National Cancer [place]) or 1-273.867.3239 (American Lung Association) or you can access the web based program at www.lungMunetrix.org.    [place] Tobacco Users Help Line:  (476) 566-1962       Toll Free: 1-878.520.8407  Quit Tobacco Program [facility] (284)530-8436    Crisis Hotline:    [place] Crisis Hotline:  2-048-TIQHQIK or 1-996.553.2552    [place] Crisis Hotline:    1-331.430.3768 or 665-696-4961    Discharge Survey:   Thank you for choosing [facility]. We hope we did everything we could to make your hospital stay a pleasant one. You may be receiving a phone survey and we would appreciate your time and participation in answering the questions. Your input is very valuable to us in our efforts to improve our service to our patients and their families.        My signature on this form indicates that:    1. I have reviewed and understand the above information.  2. My questions regarding this information have been answered to my satisfaction.  3. I have formulated a plan with my discharge nurse to obtain  my prescribed medications for home.                  Disclaimer         __________________________________                     __________       ________                       Patient Signature                                                 Date                    Time

## 2017-02-27 NOTE — H&P
[facility] INTERNAL MEDICINE HISTORY/ PHYSICAL:    Murray Sandhu  Date & Time note created:    2/27/2017   6:26 AM       Patient ID:   Name:             Kurt Freeman   YOB: 1985  Age:                 31 y.o.  male   MRN:               [id number]    Admitting Senior (R2/R3):  Dr. Sandhu   Admitting Attending/Team Color:  Dr. Leonard  ###############################################################    Chief Complaint:      Transferred from outside facility (Care flight)  Peritonsillar, retropharyngeal abscess     History of Present Illness:   Mr. Freeman is a 31 year old M with no significant past medical history was transferred from outside facility after presenting with sore throat at outside facility. As per outside records, his symptoms started over the weekend when he noticed some pain with swallowing a tylenol pill which progressed to worsening swallowing, voice changes and mild pain to the L side of the neck with range of motion. He was seen at the outside facility where a CT reportedly showed 2.5cm peritonsilar abscess with moderate retropharyngeal abscess 8 mm in thickness extending from C2-C4. He initially left the hospital AMA but returned an hour later to procede with medical management. He was given Clindamycin and decadron and intubated for airway protection prior to care flight to [facility]. He arrived to [facility] intubated and sedated on propofol. History was collected from the patient's chart. Outside labs showed: WBC: 16.5 (59N), LA: 1.5, CRP: 13.10 and mild transaminitis.     Review of Systems:      Unable to obtain 2/2 intubated, sedated.     Past Medical/ Family / Social history (PFSH):        Unable to obtain 2/2 intubated, sedated.     Current Outpatient Medications:  Home Medications     Reviewed by Lizabeth Hurst R.N. (Registered Nurse) on 02/27/17 at 6705  Med List Status: Unable to Obtain    Medication Last Dose Status          Patient Gorge Taking any Medications                   "      Medication Allergy/Sensitivities:  No Known Allergies      Family History:  Unable to obtain 2/2 intubated, sedated.     Social History:  Unable to obtain 2/2 intubated, sedated.              #################################################################  Physical Exam:  Vitals/ General Appearance:   Weight/BMI: Body mass index is 40.94 kg/(m^2).  /69 mmHg  Pulse 80  Temp(Src) 36.9 °C (98.5 °F)  Resp 16  Ht 1.88 m (6' 2\")  Wt 144.697 kg (319 lb)  BMI 40.94 kg/m2  SpO2 98%  Filed Vitals:    02/27/17 0508 02/27/17 0529 02/27/17 0530 02/27/17 0600   BP:       Pulse:   80    Temp:  36.9 °C (98.5 °F)     Resp:   14 16   Height:       Weight:       SpO2: 93%  98%      Oxygen Therapy:  Pulse Oximetry: 98 %, O2 Delivery: Ventilator    General: vented, sedated.   HEENT: EOMI. Scleral clear.  CVS: normal S1, S2. NSR. No m/r/g. No JVD.   PULM: CTABL . No w/r/r. No basilar crackles.   ABD: soft, NT, ND. +BS.   : +montgomery.    EXT: no LE edema b/l. No cyanosis.  Neuro: Vented, sedated.   Skin: mild LE abrasions.       #################################################################  [facility] (Data Review):     (yes) Old Records Requested  (yes) Records reviewed and summarized in current documentation    Lab Data Review:  Recent Results (from the past 24 hour(s))   CBC WITH DIFFERENTIAL    Collection Time: 02/27/17  4:45 AM   Result Value Ref Range    WBC 10.6 4.8 - 10.8 K/uL    RBC 4.73 4.70 - 6.10 M/uL    Hemoglobin 13.7 (L) 14.0 - 18.0 g/dL    Hematocrit 42.6 42.0 - 52.0 %    MCV 90.1 81.4 - 97.8 fL    MCH 29.0 27.0 - 33.0 pg    MCHC 32.2 (L) 33.7 - 35.3 g/dL    RDW 44.7 35.9 - 50.0 fL    Platelet Count 180 164 - 446 K/uL    MPV 11.2 9.0 - 12.9 fL    Neutrophils-Polys 90.30 (H) 44.00 - 72.00 %    Lymphocytes 7.20 (L) 22.00 - 41.00 %    Monocytes 1.90 0.00 - 13.40 %    Eosinophils 0.00 0.00 - 6.90 %    Basophils 0.10 0.00 - 1.80 %    Immature Granulocytes 0.50 0.00 - 0.90 %    Nucleated RBC 0.00 /100 WBC    " Neutrophils (Absolute) 9.59 (H) 1.82 - 7.42 K/uL    Lymphs (Absolute) 0.76 (L) 1.00 - 4.80 K/uL    Monos (Absolute) 0.20 0.00 - 0.85 K/uL    Eos (Absolute) 0.00 0.00 - 0.51 K/uL    Baso (Absolute) 0.01 0.00 - 0.12 K/uL    Immature Granulocytes (abs) 0.05 0.00 - 0.11 K/uL    NRBC (Absolute) 0.00 K/uL   COMP METABOLIC PANEL    Collection Time: 02/27/17  4:45 AM   Result Value Ref Range    Sodium 134 (L) 135 - 145 mmol/L    Potassium 4.0 3.6 - 5.5 mmol/L    Chloride 103 96 - 112 mmol/L    Co2 21 20 - 33 mmol/L    Anion Gap 10.0 0.0 - 11.9    Glucose 145 (H) 65 - 99 mg/dL    Bun 11 8 - 22 mg/dL    Creatinine 0.77 0.50 - 1.40 mg/dL    Calcium 9.0 8.5 - 10.5 mg/dL    AST(SGOT) 35 12 - 45 U/L    ALT(SGPT) 82 (H) 2 - 50 U/L    Alkaline Phosphatase 84 30 - 99 U/L    Total Bilirubin 1.5 0.1 - 1.5 mg/dL    Albumin 3.4 3.2 - 4.9 g/dL    Total Protein 7.8 6.0 - 8.2 g/dL    Globulin 4.4 (H) 1.9 - 3.5 g/dL    A-G Ratio 0.8 g/dL   APTT    Collection Time: 02/27/17  4:45 AM   Result Value Ref Range    APTT 28.7 24.7 - 36.0 sec   PROTHROMBIN TIME    Collection Time: 02/27/17  4:45 AM   Result Value Ref Range    PT 14.0 12.0 - 14.6 sec    INR 1.05 0.87 - 1.13   LACTIC ACID    Collection Time: 02/27/17  4:45 AM   Result Value Ref Range    Lactic Acid 2.1 (H) 0.5 - 2.0 mmol/L   TRIGLYCERIDE    Collection Time: 02/27/17  4:45 AM   Result Value Ref Range    Triglycerides 84 0 - 149 mg/dL   ESTIMATED GFR    Collection Time: 02/27/17  4:45 AM   Result Value Ref Range    GFR If African American >60 >60 mL/min/1.73 m 2    GFR If Non African American >60 >60 mL/min/1.73 m 2   ISTAT ARTERIAL BLOOD GAS    Collection Time: 02/27/17  5:08 AM   Result Value Ref Range    Ph 7.288 (LL) 7.400 - 7.500    Pco2 48.6 (H) 26.0 - 37.0 mmHg    Po2 86 64 - 87 mmHg    Tco2 25 20 - 33 mmol/L    S02 95 93 - 99 %    Hco3 23.3 17.0 - 25.0 mmol/L    BE -4 -4 - 3 mmol/L    Body Temp see below degrees    O2 Therapy 70 %    Specimen Arterial    EKG (NOW)    Collection  Time: 17  5:20 AM   Result Value Ref Range    Report       [facility] Emergency Dept.    Test Date:  2017  Pt Name:    BISHOP UMAÑA                 Department: ER  MRN:        [id number]                      Room:        07  Gender:     M                            Technician: [id number]  :        1985                   Requested By:TORO RAPP  Order #:    [id number]                    Reading MD:    Measurements  Intervals                                Axis  Rate:       80                           P:          16  LA:         152                          QRS:        48  QRSD:       88                           T:          18  QT:         396  QTc:        457    Interpretive Statements  SINUS RHYTHM  No previous ECG available for comparison         Imaging/Procedures Review:    (yes) Independant Review     DX-CHEST-PORTABLE (1 VIEW)   Final Result      1.  ETT tip projects in satisfactory position.      2.  Mild central pulmonary vascular congestion.      3.  No lobar consolidation or pneumothorax.      CT-FOREIGN FILM CAT SCAN   Preliminary Result      OUTSIDE IMAGES-DX CHEST   Preliminary Result         EKG:   NSR, no acute ischemic changes.   HR: 80, QTC: 457    MDM (Assessment and Plan):     1. Peritonsilar abscess   2. VDRF   3. Transaminitis, resolving   4. Leukocytosis, resolving    Patient with no significant past medical history admitted after transfer from outside facility with CT showing 2.5cm peritonsilar abscess and 0.8cm retropharyngeal abscess. He was intubated for airway protection and seen by ENT (Dr. Ann) shortly upon arrival to [facility] ED. The patient underwent bedside I&D by ENT and was found to have minimal purulent drainage from a Left peritonsilar abscess, which was sent for culture/sensitivites. Review of CT by ENT shows no significant signs of retropharyngeal abscess. His white count and transaminitis have improved since arrival to [facility]. He is  currently hemodynamically stable and will be admitted to the ICU for medical management.     PLAN:   - admit to ICU   - s/p bedside I&D by ENT, f/u ENT recs   - continue vent support today, consider extubation tomorrow   - RT/O2 protocol, azam  - unasyn empirically, f/u BCx, WCx   - IVFs, trend LA x 2 (until trends down)

## 2017-02-27 NOTE — ED NOTES
Pt BIB careflight - peritonsilar and retropharyngeal abcess, transferred here for higher level of care, intubated PTA.  PIV established, blood drawn, 1 set of cultures sent, urine sent to lab.  Armendariz in place from PTA. RT at bedside. ERP at bedside. Pt received clindamycin and dexamethasone PTA

## 2017-02-27 NOTE — DISCHARGE PLANNING
Anticipated Discharge Information  Anticipated discharge disposition: Home    Care Transition Team Interventions  Were Resources Provided?: None at this time  Community Referrals: None at this time  Internal Referral:none at this time         Discharge Plan:  Incomplete at this time.    Anticipate: Pt is expected to dc home. His sister will provide transportation. Pt follow-up with [facility] in [facility].

## 2017-02-27 NOTE — IP AVS SNAPSHOT
[employer] [place] Access Code: [email or web address]  Expires: 4/1/2017  1:58 PM    Your email address is not on file at [employer].  Email Addresses are required for you to sign up for [employer], please contact [phone] to verify your personal information and to provide your email address prior to attempting to register for [employer].    Kurt Woods Pete  [address] [place] [address]    [employer] [place]  A secure, online tool to manage your health information     [employer]’s [employer]® is a secure, online tool that connects you to your personalized health information from the privacy of your home -- day or night - making it very easy for you to manage your healthcare. Once the activation process is completed, you can even access your medical information using the [employer] [place] cyrus, which is available for free in the Apple Cyrus store or Google Play store.     To learn more about [employer], visit [email or web address]    There are two levels of access available (as shown below):   My Chart Features  [facility] Primary Care Doctor [facility]  Specialists [facility]  Urgent  Care Non-[facility] Primary Care Doctor   Email your healthcare team securely and privately 24/7 X X X    Manage appointments: schedule your next appointment; view details of past/upcoming appointments X      Request prescription refills. X      View recent personal medical records, including lab and immunizations X X X X   View health record, including health history, allergies, medications X X X X   Read reports about your outpatient visits, procedures, consult and ER notes X X X X   See your discharge summary, which is a recap of your hospital and/or ER visit that includes your diagnosis, lab results, and care plan X X  X     How to register for [employer] [place]:  Once your e-mail address has been verified, follow the following steps to sign up for [employer] [place].     1. Go to  [email or web address]  2. Click on the Sign Up Now box, which takes you to the New Member Sign Up page. You will  need to provide the following information:  a. Enter your [employer] Access Code exactly as it appears at the top of this page. (You will not need to use this code after you’ve completed the sign-up process. If you do not sign up before the expiration date, you must request a new code.)   b. Enter your date of birth.   c. Enter your home email address.   d. Click Submit, and follow the next screen’s instructions.  3. Create a [employer] ID. This will be your [employer] login ID and cannot be changed, so think of one that is secure and easy to remember.  4. Create a [employer] password. You can change your password at any time.  5. Enter your Password Reset Question and Answer. This can be used at a later time if you forget your password.   6. Enter your e-mail address. This allows you to receive e-mail notifications when new information is available in [employer].  7. Click Sign Up. You can now view your health information.    For assistance activating your [employer] account, call [phone]

## 2017-02-28 ENCOUNTER — APPOINTMENT (OUTPATIENT)
Dept: RADIOLOGY | Facility: MEDICAL CENTER | Age: 32
DRG: 133 | End: 2017-02-28
Attending: INTERNAL MEDICINE
Payer: COMMERCIAL

## 2017-02-28 LAB
ALBUMIN SERPL BCP-MCNC: 2.6 G/DL (ref 3.2–4.9)
ALBUMIN/GLOB SERPL: 0.7 G/DL
ALP SERPL-CCNC: 60 U/L (ref 30–99)
ALT SERPL-CCNC: 58 U/L (ref 2–50)
ANION GAP SERPL CALC-SCNC: 10 MMOL/L (ref 0–11.9)
AST SERPL-CCNC: 43 U/L (ref 12–45)
BASE EXCESS BLDA CALC-SCNC: 0 MMOL/L (ref -4–3)
BASOPHILS # BLD AUTO: 0.2 % (ref 0–1.8)
BASOPHILS # BLD: 0.02 K/UL (ref 0–0.12)
BILIRUB SERPL-MCNC: 0.7 MG/DL (ref 0.1–1.5)
BODY TEMPERATURE: ABNORMAL DEGREES
BUN SERPL-MCNC: 13 MG/DL (ref 8–22)
CALCIUM SERPL-MCNC: 8.1 MG/DL (ref 8.5–10.5)
CHLORIDE SERPL-SCNC: 105 MMOL/L (ref 96–112)
CO2 BLDA-SCNC: 26 MMOL/L (ref 20–33)
CO2 SERPL-SCNC: 22 MMOL/L (ref 20–33)
CREAT SERPL-MCNC: 0.72 MG/DL (ref 0.5–1.4)
EOSINOPHIL # BLD AUTO: 0.01 K/UL (ref 0–0.51)
EOSINOPHIL NFR BLD: 0.1 % (ref 0–6.9)
ERYTHROCYTE [DISTWIDTH] IN BLOOD BY AUTOMATED COUNT: 46.1 FL (ref 35.9–50)
GFR SERPL CREATININE-BSD FRML MDRD: >60 ML/MIN/1.73 M 2
GLOBULIN SER CALC-MCNC: 3.5 G/DL (ref 1.9–3.5)
GLUCOSE SERPL-MCNC: 122 MG/DL (ref 65–99)
HCO3 BLDA-SCNC: 24.8 MMOL/L (ref 17–25)
HCT VFR BLD AUTO: 35.1 % (ref 42–52)
HGB BLD-MCNC: 11.3 G/DL (ref 14–18)
IMM GRANULOCYTES # BLD AUTO: 0.01 K/UL (ref 0–0.11)
IMM GRANULOCYTES NFR BLD AUTO: 0.1 % (ref 0–0.9)
LACTATE BLD-SCNC: 1.9 MMOL/L (ref 0.5–2)
LYMPHOCYTES # BLD AUTO: 1.81 K/UL (ref 1–4.8)
LYMPHOCYTES NFR BLD: 21.3 % (ref 22–41)
MAGNESIUM SERPL-MCNC: 2.1 MG/DL (ref 1.5–2.5)
MCH RBC QN AUTO: 29.4 PG (ref 27–33)
MCHC RBC AUTO-ENTMCNC: 32.2 G/DL (ref 33.7–35.3)
MCV RBC AUTO: 91.4 FL (ref 81.4–97.8)
MONOCYTES # BLD AUTO: 0.61 K/UL (ref 0–0.85)
MONOCYTES NFR BLD AUTO: 7.2 % (ref 0–13.4)
NEUTROPHILS # BLD AUTO: 6.03 K/UL (ref 1.82–7.42)
NEUTROPHILS NFR BLD: 71.1 % (ref 44–72)
NRBC # BLD AUTO: 0 K/UL
NRBC BLD AUTO-RTO: 0 /100 WBC
O2/TOTAL GAS SETTING VFR VENT: 30 %
PCO2 BLDA: 39.3 MMHG (ref 26–37)
PCO2 TEMP ADJ BLDA: 37.9 MMHG (ref 26–37)
PH BLDA: 7.41 [PH] (ref 7.4–7.5)
PH TEMP ADJ BLDA: 7.42 [PH] (ref 7.4–7.5)
PHOSPHATE SERPL-MCNC: 3.5 MG/DL (ref 2.5–4.5)
PLATELET # BLD AUTO: 163 K/UL (ref 164–446)
PMV BLD AUTO: 10.9 FL (ref 9–12.9)
PO2 BLDA: 59 MMHG (ref 64–87)
PO2 TEMP ADJ BLDA: 55 MMHG (ref 64–87)
POTASSIUM SERPL-SCNC: 3.5 MMOL/L (ref 3.6–5.5)
PROT SERPL-MCNC: 6.1 G/DL (ref 6–8.2)
RBC # BLD AUTO: 3.84 M/UL (ref 4.7–6.1)
SAO2 % BLDA: 90 % (ref 93–99)
SODIUM SERPL-SCNC: 137 MMOL/L (ref 135–145)
SPECIMEN DRAWN FROM PATIENT: ABNORMAL
WBC # BLD AUTO: 8.5 K/UL (ref 4.8–10.8)

## 2017-02-28 PROCEDURE — A9270 NON-COVERED ITEM OR SERVICE: HCPCS | Performed by: STUDENT IN AN ORGANIZED HEALTH CARE EDUCATION/TRAINING PROGRAM

## 2017-02-28 PROCEDURE — 99291 CRITICAL CARE FIRST HOUR: CPT | Mod: GC | Performed by: INTERNAL MEDICINE

## 2017-02-28 PROCEDURE — 94770 HCHG CO2 EXPIRED GAS DETERMINATION: CPT

## 2017-02-28 PROCEDURE — 84100 ASSAY OF PHOSPHORUS: CPT

## 2017-02-28 PROCEDURE — 82803 BLOOD GASES ANY COMBINATION: CPT

## 2017-02-28 PROCEDURE — 700111 HCHG RX REV CODE 636 W/ 250 OVERRIDE (IP): Performed by: PHARMACIST

## 2017-02-28 PROCEDURE — 700102 HCHG RX REV CODE 250 W/ 637 OVERRIDE(OP): Performed by: STUDENT IN AN ORGANIZED HEALTH CARE EDUCATION/TRAINING PROGRAM

## 2017-02-28 PROCEDURE — 700111 HCHG RX REV CODE 636 W/ 250 OVERRIDE (IP): Performed by: INTERNAL MEDICINE

## 2017-02-28 PROCEDURE — 700112 HCHG RX REV CODE 229: Performed by: STUDENT IN AN ORGANIZED HEALTH CARE EDUCATION/TRAINING PROGRAM

## 2017-02-28 PROCEDURE — 80053 COMPREHEN METABOLIC PANEL: CPT

## 2017-02-28 PROCEDURE — 770006 HCHG ROOM/CARE - MED/SURG/GYN SEMI*

## 2017-02-28 PROCEDURE — 36600 WITHDRAWAL OF ARTERIAL BLOOD: CPT

## 2017-02-28 PROCEDURE — 71010 DX-CHEST-PORTABLE (1 VIEW): CPT

## 2017-02-28 PROCEDURE — 94003 VENT MGMT INPAT SUBQ DAY: CPT

## 2017-02-28 PROCEDURE — A9270 NON-COVERED ITEM OR SERVICE: HCPCS | Performed by: INTERNAL MEDICINE

## 2017-02-28 PROCEDURE — 94640 AIRWAY INHALATION TREATMENT: CPT

## 2017-02-28 PROCEDURE — 83735 ASSAY OF MAGNESIUM: CPT

## 2017-02-28 PROCEDURE — 94150 VITAL CAPACITY TEST: CPT

## 2017-02-28 PROCEDURE — 700111 HCHG RX REV CODE 636 W/ 250 OVERRIDE (IP): Performed by: STUDENT IN AN ORGANIZED HEALTH CARE EDUCATION/TRAINING PROGRAM

## 2017-02-28 PROCEDURE — 700102 HCHG RX REV CODE 250 W/ 637 OVERRIDE(OP): Performed by: INTERNAL MEDICINE

## 2017-02-28 PROCEDURE — 83605 ASSAY OF LACTIC ACID: CPT

## 2017-02-28 PROCEDURE — 85025 COMPLETE CBC W/AUTO DIFF WBC: CPT

## 2017-02-28 PROCEDURE — 700105 HCHG RX REV CODE 258: Performed by: STUDENT IN AN ORGANIZED HEALTH CARE EDUCATION/TRAINING PROGRAM

## 2017-02-28 PROCEDURE — 700101 HCHG RX REV CODE 250: Performed by: INTERNAL MEDICINE

## 2017-02-28 RX ORDER — FOLIC ACID 1 MG/1
1 TABLET ORAL DAILY
Status: DISCONTINUED | OUTPATIENT
Start: 2017-02-28 | End: 2017-03-02 | Stop reason: HOSPADM

## 2017-02-28 RX ORDER — POTASSIUM CHLORIDE 1.5 G/1.58G
40 POWDER, FOR SOLUTION ORAL ONCE
Status: COMPLETED | OUTPATIENT
Start: 2017-02-28 | End: 2017-02-28

## 2017-02-28 RX ORDER — OXYCODONE HYDROCHLORIDE 5 MG/1
5 TABLET ORAL EVERY 6 HOURS PRN
Status: DISCONTINUED | OUTPATIENT
Start: 2017-02-28 | End: 2017-03-02 | Stop reason: HOSPADM

## 2017-02-28 RX ORDER — ADENOSINE 3 MG/ML
INJECTION, SOLUTION INTRAVENOUS
Status: DISCONTINUED
Start: 2017-02-28 | End: 2017-02-28

## 2017-02-28 RX ORDER — THIAMINE MONONITRATE (VIT B1) 100 MG
100 TABLET ORAL DAILY
Status: DISCONTINUED | OUTPATIENT
Start: 2017-02-28 | End: 2017-03-02 | Stop reason: HOSPADM

## 2017-02-28 RX ADMIN — PROPOFOL 80 MCG/KG/MIN: 10 INJECTION, EMULSION INTRAVENOUS at 09:29

## 2017-02-28 RX ADMIN — Medication 100 MG: at 10:10

## 2017-02-28 RX ADMIN — OXYCODONE HYDROCHLORIDE 5 MG: 5 TABLET ORAL at 19:35

## 2017-02-28 RX ADMIN — DOCUSATE SODIUM 100 MG: 100 CAPSULE ORAL at 20:50

## 2017-02-28 RX ADMIN — FOLIC ACID 1 MG: 1 TABLET ORAL at 10:10

## 2017-02-28 RX ADMIN — PROPOFOL 80 MCG/KG/MIN: 10 INJECTION, EMULSION INTRAVENOUS at 05:15

## 2017-02-28 RX ADMIN — SODIUM CHLORIDE: 9 INJECTION, SOLUTION INTRAVENOUS at 08:00

## 2017-02-28 RX ADMIN — CHLORHEXIDINE GLUCONATE 15 ML: 1.2 RINSE ORAL at 08:02

## 2017-02-28 RX ADMIN — AMPICILLIN SODIUM AND SULBACTAM SODIUM 3 G: 2; 1 INJECTION, POWDER, FOR SOLUTION INTRAMUSCULAR; INTRAVENOUS at 11:36

## 2017-02-28 RX ADMIN — FENTANYL CITRATE 100 MCG: 50 INJECTION, SOLUTION INTRAMUSCULAR; INTRAVENOUS at 04:22

## 2017-02-28 RX ADMIN — AMPICILLIN SODIUM AND SULBACTAM SODIUM 3 G: 2; 1 INJECTION, POWDER, FOR SOLUTION INTRAMUSCULAR; INTRAVENOUS at 05:15

## 2017-02-28 RX ADMIN — AMPICILLIN SODIUM AND SULBACTAM SODIUM 3 G: 2; 1 INJECTION, POWDER, FOR SOLUTION INTRAMUSCULAR; INTRAVENOUS at 01:07

## 2017-02-28 RX ADMIN — PROPOFOL 80 MCG/KG/MIN: 10 INJECTION, EMULSION INTRAVENOUS at 02:39

## 2017-02-28 RX ADMIN — PROPOFOL 80 MCG/KG/MIN: 10 INJECTION, EMULSION INTRAVENOUS at 07:57

## 2017-02-28 RX ADMIN — PROPOFOL 80 MCG/KG/MIN: 10 INJECTION, EMULSION INTRAVENOUS at 06:40

## 2017-02-28 RX ADMIN — AMPICILLIN SODIUM AND SULBACTAM SODIUM 3 G: 2; 1 INJECTION, POWDER, FOR SOLUTION INTRAMUSCULAR; INTRAVENOUS at 18:40

## 2017-02-28 RX ADMIN — IPRATROPIUM BROMIDE AND ALBUTEROL SULFATE 3 ML: .5; 3 SOLUTION RESPIRATORY (INHALATION) at 03:48

## 2017-02-28 RX ADMIN — POTASSIUM CHLORIDE 40 MEQ: 1.5 POWDER, FOR SOLUTION ORAL at 08:02

## 2017-02-28 RX ADMIN — FENTANYL CITRATE 100 MCG: 50 INJECTION, SOLUTION INTRAMUSCULAR; INTRAVENOUS at 00:59

## 2017-02-28 RX ADMIN — SODIUM CHLORIDE: 9 INJECTION, SOLUTION INTRAVENOUS at 00:00

## 2017-02-28 RX ADMIN — STANDARDIZED SENNA CONCENTRATE AND DOCUSATE SODIUM 1 TABLET: 8.6; 5 TABLET, FILM COATED ORAL at 20:50

## 2017-02-28 RX ADMIN — SODIUM CHLORIDE: 9 INJECTION, SOLUTION INTRAVENOUS at 16:26

## 2017-02-28 RX ADMIN — HEPARIN SODIUM 5000 UNITS: 5000 INJECTION, SOLUTION INTRAVENOUS; SUBCUTANEOUS at 20:49

## 2017-02-28 RX ADMIN — HEPARIN SODIUM 5000 UNITS: 5000 INJECTION, SOLUTION INTRAVENOUS; SUBCUTANEOUS at 05:15

## 2017-02-28 RX ADMIN — OXYCODONE HYDROCHLORIDE 5 MG: 5 TABLET ORAL at 12:30

## 2017-02-28 RX ADMIN — PROPOFOL 80 MCG/KG/MIN: 10 INJECTION, EMULSION INTRAVENOUS at 01:10

## 2017-02-28 RX ADMIN — HEPARIN SODIUM 5000 UNITS: 5000 INJECTION, SOLUTION INTRAVENOUS; SUBCUTANEOUS at 13:40

## 2017-02-28 RX ADMIN — FAMOTIDINE 20 MG: 20 TABLET, FILM COATED ORAL at 08:02

## 2017-02-28 RX ADMIN — AMPICILLIN SODIUM AND SULBACTAM SODIUM 3 G: 2; 1 INJECTION, POWDER, FOR SOLUTION INTRAMUSCULAR; INTRAVENOUS at 22:55

## 2017-02-28 RX ADMIN — PROPOFOL 80 MCG/KG/MIN: 10 INJECTION, EMULSION INTRAVENOUS at 04:25

## 2017-02-28 RX ADMIN — IPRATROPIUM BROMIDE AND ALBUTEROL SULFATE 3 ML: .5; 3 SOLUTION RESPIRATORY (INHALATION) at 08:55

## 2017-02-28 RX ADMIN — FAMOTIDINE 20 MG: 20 TABLET, FILM COATED ORAL at 20:50

## 2017-02-28 ASSESSMENT — PAIN SCALES - GENERAL
PAINLEVEL_OUTOF10: 2
PAINLEVEL_OUTOF10: 4
PAINLEVEL_OUTOF10: 2
PAINLEVEL_OUTOF10: 5
PAINLEVEL_OUTOF10: 3
PAINLEVEL_OUTOF10: 5
PAINLEVEL_OUTOF10: 0

## 2017-02-28 ASSESSMENT — LIFESTYLE VARIABLES: EVER_SMOKED: YES

## 2017-02-28 ASSESSMENT — PULMONARY FUNCTION TESTS: FVC: 1.3

## 2017-02-28 NOTE — PROGRESS NOTES
Pt wakens easily on 30mcg Propofol. Writes comprehensive notes. Easily frustrated and agitated. Pt began pulling montgomery and making attempts to pull ETT. Kicking legs and becoming combative. Propofol maxed to 80mcg. Fentanyl 100mcg IVP administered. BP does not tolerate; MAP dropped to 50s. Pt now calm and resting. Propofol titrated back down.

## 2017-02-28 NOTE — PROGRESS NOTES
[facility] Transfer Note    Admit Date: 2/27/2017  ICU Day: 2    Resident(s): Michela  Attending: MEHREEN CASANOVA/ Dr. Pino    Date & Time:   2/28/2017   2:21 PM       Patient ID:    Name:             Kurt Freeman   YOB: 1985  Age:                 31 y.o.  male   MRN:               [id number]    Diagnosis:  Tonsillar abscess    HPI:  30 y/o M no significant PMHx and etoh use was transferred from outside hospital for possible retropharyngeal abscess.   He was evaluated at outside facility for sore throat. CT scan showed 2.5cm  Peritonsillar abscess and 0.8cm retropharyngeal abscess.  He was give Clindamycin and Decadron there.  He was intubated before transferred to [facility].   e was evaluated by ENT Dr. Ann.  CT review per ENT, showed no signs of retropharyngeal abscess and showed tonsillar abscess.  He underwent I&D of peritonsillar abscess.   Had minimal pus drained.  He was started on Unasyn.  G stain showed Gram positive cocci.  Final culture and sensitivity is currently pending.  Blood Culture negative.  For  ETOH use, he was started on Thiamine and folic acid.  He was kept intubated overnight.  Successfully extubated on 2/28.  It was reported that he uses able to transfer to the medical floor.    Discussed with ICU attending, and accepting senior and intern.            Consultants:  Dr. Ann- ENT      Things to follow:    -Final C&S   -Watch for ETOH withdrawal.

## 2017-02-28 NOTE — PROGRESS NOTES
Per family, patient drinks on a daily basis. It is unclear at this time the amount and strength of the alcohol. This patient will continue to be monitored for any s/sxs of ETOH withdrawal.

## 2017-02-28 NOTE — PROGRESS NOTES
RT was in patient room suctioning d/t patient coughing. Patient broke through soft wrist restraints. 2 RNs at bedside. Pt extremely combative. 6 people holding down patient. Medications provided. Security called. Pt now visibly calmer. Family went home to decrease any stimulation and agitation. Per family, no visitors at bedside for the rest of the evening.

## 2017-02-28 NOTE — CARE PLAN
Problem: Safety  Goal: Free from accidental injury  Intervention: Initiate Safety Measures  Bed in low, locked position, near nursing station, bed alarm activated, call light within reach.      Problem: Psychosocial needs  Goal: Anxiety reduction  Intervention: Identify and remove anxiety triggers  Family left bedside to promote a quiet, calming environment for patient.

## 2017-02-28 NOTE — CARE PLAN
Problem: Ventilation Defect:  Goal: Ability to achieve and maintain unassisted ventilation or tolerate decreased levels of ventilator support  Vent day 2  Pt tolerated a 1 hour SBT. Did not follow commands.  Titrated FiO2 to 30%. No other changes made to vent.

## 2017-02-28 NOTE — PROGRESS NOTES
ICU Gold Team Progress Note               Attending/Resident:   Dr. Leonard/ Michela Date & Time created: 2/28/2017  9:08 AM     Interval History:  30 y/o M with no significant PMHx was transferred from OSH for possible retropharyngeal abscess    2/27:  Intubated at OSH for airway protection for concern of retropharyngeal abscess            CT done at OSH.              ENT Dr. Ann was consulted and patient was found to have    2/28: Agitated O.N.  Borderline Hypotensive when Propofol was increased.             BP stable.            G stain of tonsillar absess:  GPC           Afebrile, WBC wnl.        Review of Systems:  Review of Systems   Unable to perform ROS: intubated       Physical Exam:  Physical Exam   Constitutional: He appears well-developed and well-nourished.   Intubated, Sedated.    HENT:   Head: Normocephalic and atraumatic.   Eyes:   Pin point pupil.  Not reactive.    Neck: No JVD present.   Cardiovascular: Normal rate, regular rhythm and intact distal pulses.  Exam reveals friction rub. Exam reveals no gallop.    No murmur heard.  Pulmonary/Chest: Breath sounds normal. No stridor. He has no wheezes. He has no rales.   Intubated   Abdominal: Soft. Bowel sounds are normal. He exhibits no distension. There is no rebound and no guarding.   Musculoskeletal: Normal range of motion. He exhibits no edema or tenderness.   Lymphadenopathy:     He has no cervical adenopathy.   Neurological:   Sedated   Skin: Skin is warm and dry. No rash noted. No erythema. No pallor.       Labs:  Recent Labs      02/27/17   0508  02/28/17   0442   ISTATAPH  7.288*  7.409   ISTATAPCO2  48.6*  39.3*   ISTATAPO2  86  59*   ISTATATCO2  25  26   MUMZADZ2OHF  95  90*   ISTATARTHCO3  23.3  24.8   ISTATARTBE  -4  0   ISTATTEMP  see below  97.1 F   ISTATFIO2  70  30   ISTATSPEC  Arterial  Arterial   ISTATAPHTC   --   7.421   MHIWGAHA7PL   --   55*         Recent Labs      02/27/17   0445  02/28/17   0443   SODIUM  134*  137    POTASSIUM  4.0  3.5*   CHLORIDE  103  105   CO2  21  22   BUN  11  13   CREATININE  0.77  0.72   MAGNESIUM   --   2.1   PHOSPHORUS   --   3.5   CALCIUM  9.0  8.1*     Recent Labs      17   ALTSGPT  82*  58*   ASTSGOT  35  43   ALKPHOSPHAT  84  60   TBILIRUBIN  1.5  0.7   GLUCOSE  145*  122*     Recent Labs      17   RBC  4.73  3.84*   HEMOGLOBIN  13.7*  11.3*   HEMATOCRIT  42.6  35.1*   PLATELETCT  180  163*   PROTHROMBTM  14.0   --    APTT  28.7   --    INR  1.05   --      Recent Labs      17   WBC  10.6  8.5   NEUTSPOLYS  90.30*  71.10   LYMPHOCYTES  7.20*  21.30*   MONOCYTES  1.90  7.20   EOSINOPHILS  0.00  0.10   BASOPHILS  0.10  0.20   ASTSGOT  35  43   ALTSGPT  82*  58*   ALKPHOSPHAT  84  60   TBILIRUBIN  1.5  0.7           Hemodynamics:  Temp (24hrs), Av.3 °C (97.4 °F), Min:36 °C (96.8 °F), Max:36.9 °C (98.4 °F)  Temperature: 36.9 °C (98.4 °F)  Pulse  Av.1  Min: 67  Max: 97Heart Rate (Monitored): 71  NIBP: (!) 92/51 mmHg     Respiratory:  Melo Vent Mode: APVCMV, Rate (breaths/min): 18, PEEP/CPAP: 8, FiO2: 40, P Peak (PIP): 22, P MEAN: 11 Respiration: 18, Pulse Oximetry: 98 %     Work Of Breathing / Effort: Vented  RUL Breath Sounds: Clear, RML Breath Sounds: Clear, RLL Breath Sounds: Diminished, SOULEYMANE Breath Sounds: Clear, LLL Breath Sounds: Diminished  Fluids:    Intake/Output Summary (Last 24 hours) at 17 0908  Last data filed at 17 0800   Gross per 24 hour   Intake 5265.07 ml   Output   2015 ml   Net 3250.07 ml       GI/Nutrition:  Orders Placed This Encounter   Procedures   • Diet NPO     Standing Status: Standing      Number of Occurrences: 1      Standing Expiration Date:      Order Specific Question:  Type:     Answer:  Now [1]     Order Specific Question:  Restrict to:     Answer:  Strict [1]     Medical Decision Making, by Problem:  Active Hospital Problems    Diagnosis   • Tonsillar  abscess [J36]      ID:    Tonsillar Abscess  -Transferred from OSH with intubated  -ENT on board.  S/p ID of tonsillar abscess  -Cx pending, G stain tonsillar abscess: GPC  -Lactic acid trending down 2.1-> 1.4.   -WBC wnl.  Afebrile, Blood Cx NTD  -SIRS 0/4  -Con't Unasyn.    -SBT today.  Extubate if tolerates SBT  -CTM    Respiratory:   -On Ventilation for airway protection   -AB.409/39.3/24.8/59   -SBT today.  Extubate if tolerates SBT   -CTM    Behavioral:   ETOH   -Sedated. Was agitated overnight  -Folic Acid, and Thiamine  -Will monitor for withdrawal  -CTM    GI:   Transaminitis  -AST 35, ALT 82  -pattern not c/w ETOH use  -?Viral hepatitis vs. NAFLD    Fluid/Electrolytes:  Hypokalemia- K 3.5 repleted    Heme/Onc  Anemia- Mild. Hb 11.3. Could be dilutional as all cell lines decreased c/w yesterday.    Thrombocytopenia- New.  Plt 180-> 163.  Likely dilutional.           Labs reviewed, Radiology images reviewed and Medications reviewed  Armendariz catheter: Critically Ill - Requiring Accurate Measurement of Urinary Output        DVT prophylaxis - mechanical: SCDs

## 2017-02-28 NOTE — CARE PLAN
Problem: Ventilation Defect:  Goal: Ability to achieve and maintain unassisted ventilation or tolerate decreased levels of ventilator support  Adult Ventilation Update    Total Vent Days: 1      Patient Lines/Drains/Airways Status    Active Airway      Name: Placement date: Placement time: Site: Days:     Airway Group ET Tube Oral 7.5 02/27/17    Oral  less than 1                 In the last 24 hours, the patient tolerated SBT for 1 hour on settings of CPAP 8/ PS 5.    #FVC / Vital Capacity (liters) :  (not following commands at this time) (02/27/17 1525)  NIF (cm H2O) :  (not following commands) (02/27/17 1525)  Rapid Shallow Breathing Index (RR/VT): 23 (02/27/17 1525)  Plateau Pressure (Q Shift): 25 (02/27/17 0809)  Static Compliance (ml / cm H2O): 84 (02/27/17 1525)    Patient failed trials because of Barriers to Wean: Continuous Propofol Infusion (02/27/17 1051)  Barriers to SBT Weaning Trial Stopped due to:: Pt weaned for 1 hour and returned to rest settings per protocol (02/27/17 1525)  Length of Weaning Trial Length of Weaning Trial (Hours): 1 (02/27/17 1525)    n/a day post trache.  The patient is currently in pulmonary wean according to protocol.       ASV Inspiratory Pressures  % Spontaneous n/a    Sputum/Suction small  Cough: Productive (02/27/17 1600)  Sputum Amount: Small (02/27/17 1600)  Sputum Color: Bloody;Tan (02/27/17 1600)  Sputum Consistency: Thin;Thick (02/27/17 1600)    Mobility Group  Activity Performed: Unable to mobilize (02/27/17 0800)  Pt Calls for Assistance: No (02/27/17 0800)  Reason Not Mobilized: Bed rest (02/27/17 0714)  Mobilization Comments: Intubated <24 hours; RASS +4 when propofol down (02/27/17 0800)    Events/Summary/Plan: parameters attempted, Propofol remains at 40 (02/27/17 1525)very agitated at times, Propofol at 80 several times this shift, low of 30, DuoNeb rxs given q4 hr as per order, FiO2 decreased to 0.5 this shift with subsequent SBT attempt

## 2017-02-28 NOTE — FLOWSHEET NOTE
Extubation    Cuff leak noted : yes  Stridor present : no              Patient toleration : yes  RCP Complete? : yes  Events/Summary/Plan: extubated without incident, placed on 6lpm NC. Lung sounds equal, clear bilat.   Strong productive cough.

## 2017-03-01 ENCOUNTER — APPOINTMENT (OUTPATIENT)
Dept: RADIOLOGY | Facility: MEDICAL CENTER | Age: 32
DRG: 133 | End: 2017-03-01
Attending: INTERNAL MEDICINE
Payer: COMMERCIAL

## 2017-03-01 PROBLEM — F10.10 ETOH ABUSE: Status: ACTIVE | Noted: 2017-03-01

## 2017-03-01 PROBLEM — E87.6 HYPOKALEMIA: Status: ACTIVE | Noted: 2017-03-01

## 2017-03-01 LAB
ALBUMIN SERPL BCP-MCNC: 2.6 G/DL (ref 3.2–4.9)
ALBUMIN/GLOB SERPL: 0.7 G/DL
ALP SERPL-CCNC: 72 U/L (ref 30–99)
ALT SERPL-CCNC: 98 U/L (ref 2–50)
ANION GAP SERPL CALC-SCNC: 8 MMOL/L (ref 0–11.9)
AST SERPL-CCNC: 121 U/L (ref 12–45)
BACTERIA WND AEROBE CULT: ABNORMAL
BACTERIA WND AEROBE CULT: ABNORMAL
BASOPHILS # BLD AUTO: 0.8 % (ref 0–1.8)
BASOPHILS # BLD: 0.05 K/UL (ref 0–0.12)
BILIRUB SERPL-MCNC: 0.7 MG/DL (ref 0.1–1.5)
BUN SERPL-MCNC: 9 MG/DL (ref 8–22)
CALCIUM SERPL-MCNC: 8 MG/DL (ref 8.5–10.5)
CHLORIDE SERPL-SCNC: 108 MMOL/L (ref 96–112)
CO2 SERPL-SCNC: 21 MMOL/L (ref 20–33)
CREAT SERPL-MCNC: 0.74 MG/DL (ref 0.5–1.4)
EOSINOPHIL # BLD AUTO: 0.09 K/UL (ref 0–0.51)
EOSINOPHIL NFR BLD: 1.4 % (ref 0–6.9)
ERYTHROCYTE [DISTWIDTH] IN BLOOD BY AUTOMATED COUNT: 46.1 FL (ref 35.9–50)
GFR SERPL CREATININE-BSD FRML MDRD: >60 ML/MIN/1.73 M 2
GLOBULIN SER CALC-MCNC: 3.8 G/DL (ref 1.9–3.5)
GLUCOSE SERPL-MCNC: 95 MG/DL (ref 65–99)
GRAM STN SPEC: ABNORMAL
HCT VFR BLD AUTO: 38.8 % (ref 42–52)
HGB BLD-MCNC: 12.4 G/DL (ref 14–18)
IMM GRANULOCYTES # BLD AUTO: 0.03 K/UL (ref 0–0.11)
IMM GRANULOCYTES NFR BLD AUTO: 0.5 % (ref 0–0.9)
LYMPHOCYTES # BLD AUTO: 2.22 K/UL (ref 1–4.8)
LYMPHOCYTES NFR BLD: 34.2 % (ref 22–41)
MCH RBC QN AUTO: 29.1 PG (ref 27–33)
MCHC RBC AUTO-ENTMCNC: 32 G/DL (ref 33.7–35.3)
MCV RBC AUTO: 91.1 FL (ref 81.4–97.8)
MONOCYTES # BLD AUTO: 0.46 K/UL (ref 0–0.85)
MONOCYTES NFR BLD AUTO: 7.1 % (ref 0–13.4)
NEUTROPHILS # BLD AUTO: 3.64 K/UL (ref 1.82–7.42)
NEUTROPHILS NFR BLD: 56 % (ref 44–72)
NRBC # BLD AUTO: 0 K/UL
NRBC BLD AUTO-RTO: 0 /100 WBC
PHOSPHATE SERPL-MCNC: 3.5 MG/DL (ref 2.5–4.5)
PLATELET # BLD AUTO: 187 K/UL (ref 164–446)
PMV BLD AUTO: 11.1 FL (ref 9–12.9)
POTASSIUM SERPL-SCNC: 3.2 MMOL/L (ref 3.6–5.5)
PROT SERPL-MCNC: 6.4 G/DL (ref 6–8.2)
RBC # BLD AUTO: 4.26 M/UL (ref 4.7–6.1)
SIGNIFICANT IND 70042: ABNORMAL
SITE SITE: ABNORMAL
SODIUM SERPL-SCNC: 137 MMOL/L (ref 135–145)
SOURCE SOURCE: ABNORMAL
WBC # BLD AUTO: 6.5 K/UL (ref 4.8–10.8)

## 2017-03-01 PROCEDURE — 71010 DX-CHEST-PORTABLE (1 VIEW): CPT

## 2017-03-01 PROCEDURE — 700102 HCHG RX REV CODE 250 W/ 637 OVERRIDE(OP): Performed by: INTERNAL MEDICINE

## 2017-03-01 PROCEDURE — 700105 HCHG RX REV CODE 258: Performed by: STUDENT IN AN ORGANIZED HEALTH CARE EDUCATION/TRAINING PROGRAM

## 2017-03-01 PROCEDURE — 700111 HCHG RX REV CODE 636 W/ 250 OVERRIDE (IP): Performed by: STUDENT IN AN ORGANIZED HEALTH CARE EDUCATION/TRAINING PROGRAM

## 2017-03-01 PROCEDURE — 85025 COMPLETE CBC W/AUTO DIFF WBC: CPT

## 2017-03-01 PROCEDURE — A9270 NON-COVERED ITEM OR SERVICE: HCPCS | Performed by: STUDENT IN AN ORGANIZED HEALTH CARE EDUCATION/TRAINING PROGRAM

## 2017-03-01 PROCEDURE — 36415 COLL VENOUS BLD VENIPUNCTURE: CPT

## 2017-03-01 PROCEDURE — A9270 NON-COVERED ITEM OR SERVICE: HCPCS | Performed by: INTERNAL MEDICINE

## 2017-03-01 PROCEDURE — A9270 NON-COVERED ITEM OR SERVICE: HCPCS | Performed by: HOSPITALIST

## 2017-03-01 PROCEDURE — 770006 HCHG ROOM/CARE - MED/SURG/GYN SEMI*

## 2017-03-01 PROCEDURE — 700105 HCHG RX REV CODE 258: Performed by: HOSPITALIST

## 2017-03-01 PROCEDURE — 700111 HCHG RX REV CODE 636 W/ 250 OVERRIDE (IP): Performed by: HOSPITALIST

## 2017-03-01 PROCEDURE — 700102 HCHG RX REV CODE 250 W/ 637 OVERRIDE(OP): Performed by: HOSPITALIST

## 2017-03-01 PROCEDURE — 84100 ASSAY OF PHOSPHORUS: CPT

## 2017-03-01 PROCEDURE — 80053 COMPREHEN METABOLIC PANEL: CPT

## 2017-03-01 PROCEDURE — 99232 SBSQ HOSP IP/OBS MODERATE 35: CPT | Mod: GC | Performed by: INTERNAL MEDICINE

## 2017-03-01 PROCEDURE — 700112 HCHG RX REV CODE 229: Performed by: STUDENT IN AN ORGANIZED HEALTH CARE EDUCATION/TRAINING PROGRAM

## 2017-03-01 RX ORDER — POTASSIUM CHLORIDE 1.5 G/1.58G
40 POWDER, FOR SOLUTION ORAL 2 TIMES DAILY
Status: COMPLETED | OUTPATIENT
Start: 2017-03-01 | End: 2017-03-02

## 2017-03-01 RX ORDER — AMPICILLIN 1 G/1
1 INJECTION, POWDER, FOR SOLUTION INTRAMUSCULAR; INTRAVENOUS EVERY 6 HOURS
Status: DISCONTINUED | OUTPATIENT
Start: 2017-03-01 | End: 2017-03-01

## 2017-03-01 RX ADMIN — FAMOTIDINE 20 MG: 20 TABLET, FILM COATED ORAL at 20:47

## 2017-03-01 RX ADMIN — Medication 100 MG: at 08:55

## 2017-03-01 RX ADMIN — HEPARIN SODIUM 5000 UNITS: 5000 INJECTION, SOLUTION INTRAVENOUS; SUBCUTANEOUS at 14:54

## 2017-03-01 RX ADMIN — HEPARIN SODIUM 5000 UNITS: 5000 INJECTION, SOLUTION INTRAVENOUS; SUBCUTANEOUS at 05:38

## 2017-03-01 RX ADMIN — DOCUSATE SODIUM 100 MG: 100 CAPSULE ORAL at 08:55

## 2017-03-01 RX ADMIN — FOLIC ACID 1 MG: 1 TABLET ORAL at 08:55

## 2017-03-01 RX ADMIN — FAMOTIDINE 20 MG: 20 TABLET, FILM COATED ORAL at 08:55

## 2017-03-01 RX ADMIN — OXYCODONE HYDROCHLORIDE 5 MG: 5 TABLET ORAL at 01:46

## 2017-03-01 RX ADMIN — OXYCODONE HYDROCHLORIDE 5 MG: 5 TABLET ORAL at 08:55

## 2017-03-01 RX ADMIN — POTASSIUM CHLORIDE 40 MEQ: 1.5 POWDER, FOR SOLUTION ORAL at 20:47

## 2017-03-01 RX ADMIN — AMPICILLIN SODIUM 1 G: 1 INJECTION, POWDER, FOR SOLUTION INTRAMUSCULAR; INTRAVENOUS at 14:54

## 2017-03-01 RX ADMIN — POTASSIUM CHLORIDE 40 MEQ: 1.5 POWDER, FOR SOLUTION ORAL at 11:13

## 2017-03-01 RX ADMIN — OXYCODONE HYDROCHLORIDE 5 MG: 5 TABLET ORAL at 17:37

## 2017-03-01 RX ADMIN — SODIUM CHLORIDE: 9 INJECTION, SOLUTION INTRAVENOUS at 00:27

## 2017-03-01 RX ADMIN — AMPICILLIN SODIUM AND SULBACTAM SODIUM 3 G: 2; 1 INJECTION, POWDER, FOR SOLUTION INTRAMUSCULAR; INTRAVENOUS at 12:00

## 2017-03-01 RX ADMIN — AMPICILLIN SODIUM 1 G: 1 INJECTION, POWDER, FOR SOLUTION INTRAMUSCULAR; INTRAVENOUS at 20:47

## 2017-03-01 RX ADMIN — HEPARIN SODIUM 5000 UNITS: 5000 INJECTION, SOLUTION INTRAVENOUS; SUBCUTANEOUS at 20:46

## 2017-03-01 RX ADMIN — SODIUM CHLORIDE: 9 INJECTION, SOLUTION INTRAVENOUS at 11:13

## 2017-03-01 RX ADMIN — AMPICILLIN SODIUM AND SULBACTAM SODIUM 3 G: 2; 1 INJECTION, POWDER, FOR SOLUTION INTRAMUSCULAR; INTRAVENOUS at 05:38

## 2017-03-01 RX ADMIN — OXYCODONE HYDROCHLORIDE 5 MG: 5 TABLET ORAL at 23:39

## 2017-03-01 ASSESSMENT — ENCOUNTER SYMPTOMS
DIZZINESS: 0
VOMITING: 0
BACK PAIN: 0
COUGH: 0
MYALGIAS: 0
HEADACHES: 0
WHEEZING: 0
STRIDOR: 0
NAUSEA: 0
FEVER: 0
PALPITATIONS: 0
SPUTUM PRODUCTION: 0
NECK PAIN: 1
CHILLS: 0
SORE THROAT: 1
NERVOUS/ANXIOUS: 0
SHORTNESS OF BREATH: 0
HEARTBURN: 0
WEAKNESS: 0
BLURRED VISION: 0
CONSTIPATION: 0
ABDOMINAL PAIN: 0

## 2017-03-01 ASSESSMENT — LIFESTYLE VARIABLES
CONSUMPTION TOTAL: POSITIVE
TOTAL SCORE: 0
HOW MANY TIMES IN THE PAST YEAR HAVE YOU HAD 5 OR MORE DRINKS IN A DAY: 4
HAVE YOU EVER FELT YOU SHOULD CUT DOWN ON YOUR DRINKING: NO
EVER HAD A DRINK FIRST THING IN THE MORNING TO STEADY YOUR NERVES TO GET RID OF A HANGOVER: NO
EVER FELT BAD OR GUILTY ABOUT YOUR DRINKING: NO
TOTAL SCORE: 0
ALCOHOL_USE: YES
TOTAL SCORE: 0
AVERAGE NUMBER OF DAYS PER WEEK YOU HAVE A DRINK CONTAINING ALCOHOL: 7
ON A TYPICAL DAY WHEN YOU DRINK ALCOHOL HOW MANY DRINKS DO YOU HAVE: 1
SUBSTANCE_ABUSE: 0
HAVE PEOPLE ANNOYED YOU BY CRITICIZING YOUR DRINKING: NO

## 2017-03-01 ASSESSMENT — PAIN SCALES - GENERAL
PAINLEVEL_OUTOF10: 7
PAINLEVEL_OUTOF10: 5
PAINLEVEL_OUTOF10: 6
PAINLEVEL_OUTOF10: 3
PAINLEVEL_OUTOF10: 5
PAINLEVEL_OUTOF10: 6
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
## 2017-03-01 NOTE — CARE PLAN
Problem: Safety  Goal: Will remain free from injury  Call light within reach, pt calls for assistance at all times.   Bed in low position and locked, upper bedside rails up, proper mobility signs placed, personal possessions within reach, treaded socks on.  Hourly rounding in place.          Problem: Pain Management  Goal: Pain level will decrease to patient’s comfort goal  Oxy ir prn for pain with adequate relief.

## 2017-03-01 NOTE — PROGRESS NOTES
Attending Note:  I have personally evaluated and examined this patient and agree with the findings as documented in the resident note except as documented in this attending note.  I am actively involved in the patient's care.    Tonsillar abscess- stable.  Could switch to PO Augmentin as he is tolerating PO.
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
## 2017-03-01 NOTE — PROGRESS NOTES
Report given to T3 floor nurse utilizing the SBAR method, no questions at this time.  Pt transported in wheelchair with RN transporting.

## 2017-03-02 ENCOUNTER — APPOINTMENT (OUTPATIENT)
Dept: RADIOLOGY | Facility: MEDICAL CENTER | Age: 32
DRG: 133 | End: 2017-03-02
Attending: INTERNAL MEDICINE
Payer: COMMERCIAL

## 2017-03-02 VITALS
WEIGHT: 315 LBS | TEMPERATURE: 97.8 F | HEART RATE: 74 BPM | RESPIRATION RATE: 18 BRPM | OXYGEN SATURATION: 92 % | BODY MASS INDEX: 40.43 KG/M2 | HEIGHT: 74 IN | DIASTOLIC BLOOD PRESSURE: 67 MMHG | SYSTOLIC BLOOD PRESSURE: 131 MMHG

## 2017-03-02 LAB
ANION GAP SERPL CALC-SCNC: 5 MMOL/L (ref 0–11.9)
BUN SERPL-MCNC: 6 MG/DL (ref 8–22)
CALCIUM SERPL-MCNC: 8.6 MG/DL (ref 8.5–10.5)
CHLORIDE SERPL-SCNC: 105 MMOL/L (ref 96–112)
CO2 SERPL-SCNC: 26 MMOL/L (ref 20–33)
CREAT SERPL-MCNC: 0.71 MG/DL (ref 0.5–1.4)
GFR SERPL CREATININE-BSD FRML MDRD: >60 ML/MIN/1.73 M 2
GLUCOSE SERPL-MCNC: 100 MG/DL (ref 65–99)
PHOSPHATE SERPL-MCNC: 3.9 MG/DL (ref 2.5–4.5)
POTASSIUM SERPL-SCNC: 3.7 MMOL/L (ref 3.6–5.5)
SODIUM SERPL-SCNC: 136 MMOL/L (ref 135–145)

## 2017-03-02 PROCEDURE — 99239 HOSP IP/OBS DSCHRG MGMT >30: CPT | Mod: GC | Performed by: INTERNAL MEDICINE

## 2017-03-02 PROCEDURE — 700102 HCHG RX REV CODE 250 W/ 637 OVERRIDE(OP): Performed by: INTERNAL MEDICINE

## 2017-03-02 PROCEDURE — 700111 HCHG RX REV CODE 636 W/ 250 OVERRIDE (IP): Performed by: HOSPITALIST

## 2017-03-02 PROCEDURE — 700112 HCHG RX REV CODE 229: Performed by: STUDENT IN AN ORGANIZED HEALTH CARE EDUCATION/TRAINING PROGRAM

## 2017-03-02 PROCEDURE — A9270 NON-COVERED ITEM OR SERVICE: HCPCS | Performed by: HOSPITALIST

## 2017-03-02 PROCEDURE — 700102 HCHG RX REV CODE 250 W/ 637 OVERRIDE(OP): Performed by: HOSPITALIST

## 2017-03-02 PROCEDURE — A9270 NON-COVERED ITEM OR SERVICE: HCPCS | Performed by: INTERNAL MEDICINE

## 2017-03-02 PROCEDURE — 36415 COLL VENOUS BLD VENIPUNCTURE: CPT

## 2017-03-02 PROCEDURE — 700105 HCHG RX REV CODE 258: Performed by: HOSPITALIST

## 2017-03-02 PROCEDURE — 80048 BASIC METABOLIC PNL TOTAL CA: CPT

## 2017-03-02 PROCEDURE — 700111 HCHG RX REV CODE 636 W/ 250 OVERRIDE (IP): Performed by: STUDENT IN AN ORGANIZED HEALTH CARE EDUCATION/TRAINING PROGRAM

## 2017-03-02 PROCEDURE — 84100 ASSAY OF PHOSPHORUS: CPT

## 2017-03-02 PROCEDURE — A9270 NON-COVERED ITEM OR SERVICE: HCPCS | Performed by: STUDENT IN AN ORGANIZED HEALTH CARE EDUCATION/TRAINING PROGRAM

## 2017-03-02 RX ORDER — AMOXICILLIN AND CLAVULANATE POTASSIUM 875; 125 MG/1; MG/1
1 TABLET, FILM COATED ORAL 2 TIMES DAILY
Qty: 14 TAB | Refills: 0 | Status: SHIPPED | OUTPATIENT
Start: 2017-03-02 | End: 2017-03-09

## 2017-03-02 RX ORDER — IBUPROFEN 600 MG/1
600 TABLET ORAL EVERY 6 HOURS PRN
Qty: 30 TAB | Refills: 0 | Status: SHIPPED | OUTPATIENT
Start: 2017-03-02

## 2017-03-02 RX ORDER — OXYCODONE HYDROCHLORIDE 5 MG/1
5 TABLET ORAL EVERY 6 HOURS PRN
Qty: 12 TAB | Refills: 0 | Status: SHIPPED | OUTPATIENT
Start: 2017-03-02 | End: 2017-03-05

## 2017-03-02 RX ADMIN — FAMOTIDINE 20 MG: 20 TABLET, FILM COATED ORAL at 07:55

## 2017-03-02 RX ADMIN — AMPICILLIN SODIUM 1 G: 1 INJECTION, POWDER, FOR SOLUTION INTRAMUSCULAR; INTRAVENOUS at 07:54

## 2017-03-02 RX ADMIN — FOLIC ACID 1 MG: 1 TABLET ORAL at 07:55

## 2017-03-02 RX ADMIN — POTASSIUM CHLORIDE 40 MEQ: 1.5 POWDER, FOR SOLUTION ORAL at 07:54

## 2017-03-02 RX ADMIN — HEPARIN SODIUM 5000 UNITS: 5000 INJECTION, SOLUTION INTRAVENOUS; SUBCUTANEOUS at 05:42

## 2017-03-02 RX ADMIN — OXYCODONE HYDROCHLORIDE 5 MG: 5 TABLET ORAL at 05:43

## 2017-03-02 RX ADMIN — AMPICILLIN SODIUM 1 G: 1 INJECTION, POWDER, FOR SOLUTION INTRAMUSCULAR; INTRAVENOUS at 02:57

## 2017-03-02 RX ADMIN — DOCUSATE SODIUM 100 MG: 100 CAPSULE ORAL at 07:55

## 2017-03-02 RX ADMIN — Medication 100 MG: at 07:55

## 2017-03-02 ASSESSMENT — PAIN SCALES - GENERAL
PAINLEVEL_OUTOF10: 5
PAINLEVEL_OUTOF10: 2

## 2017-03-02 NOTE — PROGRESS NOTES
Received shift report from day shift RN. Pt is AO4 and reports mild pain in throat, rest, repositioned, and medicated per MAR. Discussed POC. Assessed and administered evening medications. Pt upself with steady gait. Bed in lowest position, call light and personal belongings within reach, educated to call if in need of assistance, non skid socks, room near nurses station. All needs met at this time.

## 2017-03-02 NOTE — PROGRESS NOTES
Assume care for patient. Pt AA/O X4. LS clear, >92% on ra. VSS. HRR. BS+ lbm 3/1 per pt. Denies N/V. Voids via brp, gait steady, up self. CMS+ PELAEZ. WBAT to B LE. Denies N/T. Refused SCDS to BLE. Rt hand laceration dalton. PIV to left hand patent and infusing. Family at bedside. Oxy ir prn for pain with adequate relief. Discussed hourly rounding and POC, pt agreeable. Refused bed alarm, pt educated re: fall risk and need of bed alarm, Pt educated and verbalized understanding of the education, pt call for assistance at all times. Pt reoriented to skylight and call light at bedside and within reach.

## 2017-03-02 NOTE — CARE PLAN
Problem: Safety  Goal: Will remain free from injury  Call light within reach, pt calls for assistance at all times.  Bed in low position and locked, upper bedside rails up, proper mobility signs placed, personal possessions within reach, treaded socks on.  Hourly rounding in place.          Problem: Pain Management  Goal: Pain level will decrease to patient’s comfort goal  Oxy ir given prn for pain with adequate relief.

## 2017-03-02 NOTE — ASSESSMENT & PLAN NOTE
- Feeling better.  - P/E: Mild tenderness in L neck. L tonsillr swelling.  - WBC wnl. SIRS 0/4.     - Blood Cx NTD  - Continue ampicillin.  - Will monitor today. Likely DC on Augmentin if stable.

## 2017-03-02 NOTE — DISCHARGE PLANNING
Medical Social Work    SW spoke with RN regarding pt. Anticipated D/C plan is home with no needs when medically cleared.

## 2017-03-02 NOTE — PROGRESS NOTES
[facility] Interval Note    Name Kurt Freeman     1985   Age/Sex 31 y.o. male   MRN [id number]   Code Status FULL     After 5PM or if no immediate response to page, please call for cross-coverage  Attending/Team: /Riccardo Call [phone] to page   1st Call - Day Intern (R1):   Kesha 2nd Call - Day Sr. Resident (R2/R3):   Bri         Chief complaint/ reason for interval visit (Primary Diagnosis)   Tonsillar abscess    ID: Patient is 31M with no significant PMHx and etoh use transferred from outside hospital for possible retropharyngeal abscess based on CT scan results. Was intubated at time of transfer. Received I&D per ENT, Extubated after 24 hours. Transferred to floor in stable state for further monitoring and antibiotic adjustment.       Interval Problem Daily Status Update : Doing well, Complaining of some mild pain in his throat. Able to tolerate oral intake well. Denies any fever, chills or cough. Vitals are stable. Culture positive for beta hemolyticus GAS. Abx switched to Ampicillin. Will observe today and if stable will dc home on Augmentin in 24 hours.      Active Hospital Problems    Diagnosis   • Tonsillar abscess [J36]  - Cont Abx.   • ETOH abuse [F10.10]  - Will watch for S/S of withdrawal.   • Hypokalemia [E87.6]       Review of Systems   Constitutional: Negative for fever, chills and malaise/fatigue.   HENT: Positive for sore throat. Negative for congestion and ear pain.    Eyes: Negative for blurred vision.   Respiratory: Negative for cough, sputum production, shortness of breath, wheezing and stridor.    Cardiovascular: Negative for chest pain, palpitations and leg swelling.   Gastrointestinal: Negative for heartburn, nausea, vomiting, abdominal pain and constipation.   Genitourinary: Negative for dysuria.   Musculoskeletal: Positive for neck pain. Negative for myalgias and back pain.   Skin: Negative for rash.   Neurological:  Negative for dizziness, weakness and headaches.   Psychiatric/Behavioral: Negative for substance abuse. The patient is not nervous/anxious.        Consultants/Specialty  Dr Seth M.D. ENT    Disposition  Inpatient for management of Tonsillar abscess. Possible Discharge in 24 hours.    Quality Measures  Labs reviewed, Medications reviewed and Radiology images reviewed  Armendariz catheter: No Armendariz      DVT Prophylaxis: Enoxaparin (Lovenox)  DVT prophylaxis - mechanical: SCDs  Ulcer prophylaxis: No  Antibiotics: Treating active infection/contamination beyond 24 hours perioperative coverage            Physical Exam       Filed Vitals:    03/01/17 0000 03/01/17 0400 03/01/17 0800 03/01/17 1200   BP: 165/89 133/77 151/80 142/79   Pulse: 87 82 84 83   Temp: 37.1 °C (98.7 °F) 37.1 °C (98.7 °F) 36.8 °C (98.2 °F) 36.8 °C (98.2 °F)   Resp: 18 18 18 14   Height:       Weight:       SpO2: 97% 93% 94% 95%     Body mass index is 41.25 kg/(m^2).   Oxygen Therapy:  Pulse Oximetry: 95 %, O2 (LPM): 2, O2 Delivery: Nasal Cannula    Physical Exam   Constitutional: He is oriented to person, place, and time and well-developed, well-nourished, and in no distress. No distress.   HENT:   Head: Normocephalic and atraumatic.   Mouth/Throat: Posterior oropharyngeal erythema and tonsillar abscesses present.   Eyes: Conjunctivae are normal. No scleral icterus.   Neck: Neck supple. No thyromegaly present.   Left submandibular tenderness.    Cardiovascular: Normal rate and regular rhythm.  Exam reveals no gallop and no friction rub.    No murmur heard.  Pulmonary/Chest: Breath sounds normal. No respiratory distress. He has no wheezes. He has no rales.   Abdominal: Bowel sounds are normal. He exhibits no distension and no mass. There is no tenderness.   Musculoskeletal: He exhibits no edema.   Lymphadenopathy:     He has no cervical adenopathy.   Neurological: He is alert and oriented to person, place, and time.   Skin: He is not diaphoretic. No  erythema.         Lab Data Review:      3/1/2017  6:21 PM    Recent Labs      02/27/17 0445 02/28/17 0443 03/01/17 0458   SODIUM  134*  137  137   POTASSIUM  4.0  3.5*  3.2*   CHLORIDE  103  105  108   CO2  21  22  21   BUN  11  13  9   CREATININE  0.77  0.72  0.74   MAGNESIUM   --   2.1   --    PHOSPHORUS   --   3.5  3.5   CALCIUM  9.0  8.1*  8.0*       Recent Labs      02/27/17 0445 02/28/17 0443 03/01/17 0458   ALTSGPT  82*  58*  98*   ASTSGOT  35  43  121*   ALKPHOSPHAT  84  60  72   TBILIRUBIN  1.5  0.7  0.7   GLUCOSE  145*  122*  95       Recent Labs      02/27/17 0445 02/28/17 0443 03/01/17 0301   RBC  4.73  3.84*  4.26*   HEMOGLOBIN  13.7*  11.3*  12.4*   HEMATOCRIT  42.6  35.1*  38.8*   PLATELETCT  180  163*  187   PROTHROMBTM  14.0   --    --    APTT  28.7   --    --    INR  1.05   --    --        Recent Labs      02/27/17 0445 02/28/17 0443 03/01/17 0301 03/01/17 0458   WBC  10.6  8.5  6.5   --    NEUTSPOLYS  90.30*  71.10  56.00   --    LYMPHOCYTES  7.20*  21.30*  34.20   --    MONOCYTES  1.90  7.20  7.10   --    EOSINOPHILS  0.00  0.10  1.40   --    BASOPHILS  0.10  0.20  0.80   --    ASTSGOT  35  43   --   121*   ALTSGPT  82*  58*   --   98*   ALKPHOSPHAT  84  60   --   72   TBILIRUBIN  1.5  0.7   --   0.7           Assessment/Plan     Tonsillar abscess  Overview  Transferred from outside hospital with possible Retropharyngeal abscess.   Presented to OSH with sore throat, swallowing diff, voice changes and neck pain.  Outside labs showed: WBC: 16.5 (59N), LA: 1.5, CRP: 13.10 and mild transaminitis.  CT Neck: 2.5cm peritonsilar abscess with moderate retropharyngeal abscess 8 mm in thickness extending from C2-C42.  Given Clindamycin and decadron and intubated for airway protection prior to care flight to [facility]. He arrived to [facility] intubated and sedated on propofol.   ENT on board.  S/p ID of tonsillar abscess  Cx: B Hemolyticus Group A.   Started on Unasyn. Switched  to Ampicillin.       Assessment & Plan  - Feeling better.  - P/E: Mild tenderness in L neck. L tonsillr swelling.  - WBC wnl. SIRS 0/4.     - Blood Cx NTD  - Continue ampicillin.  - Will monitor today. Likely DC on Augmentin if stable.        Hypokalemia  Assessment & Plan  - Will replete prn.      ETOH abuse  Assessment & Plan  - Daily drinking.  - No S/S of withdrawals so far.  - On Thimine and Folic acid   - Will monitor.

## 2017-03-02 NOTE — DISCHARGE INSTRUCTIONS
Discharge Instructions    Discharged to home by car with relative. Discharged via wheelchair, hospital escort: Yes.  Special equipment needed: Not Applicable    Be sure to schedule a follow-up appointment with your primary care doctor or any specialists as instructed.     Discharge Plan:   Diet Plan: Discussed  Activity Level: Discussed  Smoking Cessation Offered: Patient Counseled  Confirmed Follow up Appointment: Patient to Call and Schedule Appointment  Confirmed Symptoms Management: Discussed  Medication Reconciliation Updated: Yes  Influenza Vaccine Indication: Patient Refuses    I understand that a diet low in cholesterol, fat, and sodium is recommended for good health. Unless I have been given specific instructions below for another diet, I accept this instruction as my diet prescription.   Other diet: regular diet    Special Instructions: None    · Is patient discharged on Warfarin / Coumadin?   No     · Is patient Post Blood Transfusion?  No    Depression / Suicide Risk    As you are discharged from this [facility] facility, it is important to learn how to keep safe from harming yourself.    Recognize the warning signs:  · Abrupt changes in personality, positive or negative- including increase in energy   · Giving away possessions  · Change in eating patterns- significant weight changes-  positive or negative  · Change in sleeping patterns- unable to sleep or sleeping all the time   · Unwillingness or inability to communicate  · Depression  · Unusual sadness, discouragement and loneliness  · Talk of wanting to die  · Neglect of personal appearance   · Rebelliousness- reckless behavior  · Withdrawal from people/activities they love  · Confusion- inability to concentrate     If you or a loved one observes any of these behaviors or has concerns about self-harm, here's what you can do:  · Talk about it- your feelings and reasons for harming yourself  · Remove any means that you might use to hurt yourself  (examples: pills, rope, extension cords, firearm)  · Get professional help from the community (Mental Health, Substance Abuse, psychological counseling)  · Do not be alone:Call your Safe Contact- someone whom you trust who will be there for you.  · Call your local CRISIS HOTLINE 755-7555 or 764-369-9532  · Call your local Children's Mobile Crisis Response Team [place] (746) 779-1318 or www.[employer]  · Call the toll free National Suicide Prevention Hotlines   · National Suicide Prevention Lifeline 567-151-QHIR (9849)  · National Hope Line Network 800-SUICIDE (121-3060)

## 2017-03-03 NOTE — DISCHARGE SUMMARY
[facility] Discharge Summary      Admit Date:  2/27/2017       Discharge Date:   3/2/2017    Service:   [facility] [place] [facility] Purple Team  Attending Physician(s):          Senior Resident(s):     Lam Resident(s):         Primary Diagnosis:   Left peritonsillar abscess    Secondary Diagnoses:                Active Hospital Problems    Diagnosis   • Hypokalemia [E87.6]   • ETOH abuse [F10.10]   • Peritonsillar abscess [J36]       Hospital Summary (Brief Narrative):       Mr. Freeman is a pleasant 31-year-old male with no significant past medical history was transferred from outside hospital intubated after suspicion of possible retropharyngeal abscess based on CT scan results. He was hemodynamically stable at the time of presentation. CT scan performed at [facility] showed a tonsillar abscess. ENT was consulted and I&D was performed, started on Unasyn and patient was sent to the ICU for monitoring. He was extubated the next day and transferred to the floor for continuation of care. Culture from the abscess did grew Beta hemolyticus streptococcus group A. Antibiotics switched to Ampicillin. He remained stable, able to tolerate diet and by hospital day 3 the patient was discharged home in good conditions on Augmentin for a duration of 7 days.     Patient /Hospital Summary (Details -- Problem Oriented) :          # Peritonsillar abscess  Transferred from outside hospital with possible Retropharyngeal abscess.   Presented to OSH with sore throat, swallowing diff, voice changes and neck pain.  Outside labs showed: WBC: 16.5 (59N), LA: 1.5, CRP: 13.10 and mild transaminitis.  CT Neck: 2.5cm peritonsilar abscess with moderate retropharyngeal abscess 8 mm in thickness extending from C2-C42.  Given Clindamycin and decadron and intubated for airway protection prior to care flight to [facility]. He arrived to [facility] intubated and sedated on propofol.   ENT Consulted.  S/p ID of tonsillar  abscess  Cx: B Hemolyticus Strept Group A.   Started on Unasyn. Switched to Ampicillin.   Discharged on Augmentin x 7d.       # Hypokalemia  Assessment & Plan  Resolved.      # ETOH abuse  Reported heavy  drinking.  No S/S of withdrawals during hospital stay.  Received Thimine and Folic acid       Consultants:     Dr.Courtney LINO Ann M.D. ENT.    Procedures:        I&D of peritonsillar abscess, Dr Ann, 2/27/2017.    Imaging/ Testing:      DX-CHEST-PORTABLE (1 VIEW)   Final Result      1.  Slight interval worsening in left basilar opacification, likely atelectasis.   2.  Interval extubation.      DX-CHEST-PORTABLE (1 VIEW)   Final Result      No significant change      DX-CHEST-PORTABLE (1 VIEW)   Final Result      1.  ETT tip projects in satisfactory position.      2.  Mild central pulmonary vascular congestion.      3.  No lobar consolidation or pneumothorax.      CT-FOREIGN FILM CAT SCAN   Preliminary Result      OUTSIDE IMAGES-DX CHEST   Preliminary Result            Discharge Medications:         Medication Reconciliation: Completed     Medication List      START taking these medications       Instructions    amoxicillin-clavulanate 875-125 MG Tabs   Commonly known as:  AUGMENTIN    Take 1 Tab by mouth 2 times a day for 7 days.   Dose:  1 Tab       ibuprofen 600 MG Tabs   Commonly known as:  MOTRIN    Take 1 Tab by mouth every 6 hours as needed for Mild Pain or Moderate Pain.   Dose:  600 mg       oxycodone immediate-release 5 MG Tabs   Last time this was given:  5 mg on 3/2/2017  5:43 AM   Commonly known as:  ROXICODONE    Take 1 Tab by mouth every 6 hours as needed for Severe Pain for up to 3 days.   Dose:  5 mg         STOP taking these medications          omeprazole 20 MG delayed-release capsule   Commonly known as:  PRILOSEC                  Disposition:   Home    Diet:   Regular    Activity:   As tolerated    Instructions:        The patient was instructed to return to the ER in the event of  worsening symptoms. I have counseled the patient on the importance of compliance and the patient has agreed to proceed with all medical recommendations and follow up plan indicated above.   The patient understands that all medications come with benefits and risks. Risks may include permanent injury or death and these risks can be minimized with close reassessment and monitoring.        Primary Care Provider:    No primary care provider on file.    Discharge summary faxed to primary care provider:  Deferred  Copy of discharge summary given to the patient: Completed    Follow up appointment details :      Ashley Ann Schedule an appointment as soon as possible for a visit      Pending Studies:        None    Time spent on discharge day patient visit, preparing discharge paperwork and arranging for patient follow up.    Summary of follow up issues:   Post discharge follow up.    Discharge Time (Minutes) :    45 mins      Condition on Discharge    ______________________________________________________________________    Interval history/exam for day of discharge:    Doing well. Denies fever, chills, cough or stridor. Throat pain is better. Tolerated diet with no complications. Vitals are stable.  Swelling size decreasing. Will discharge home on 1 week of augmentin.  Filed Vitals:    03/02/17 0000 03/02/17 0400 03/02/17 0800 03/02/17 0930   BP: 134/70 143/82 131/67    Pulse: 79 73 74    Temp: 37 °C (98.6 °F) 36.8 °C (98.2 °F) 36.6 °C (97.8 °F)    Resp: 18 18 18    Height:       Weight:       SpO2: 96% 95% 97% 92%     Weight/BMI: Body mass index is 41.25 kg/(m^2).  Pulse Oximetry: 92 %, O2 (LPM): 0, O2 Delivery: None (Room Air)    General: Looks comfortable. Laying flat. Not in acute distress.  HEENT: NC/AT. Mild swelling in left tonsil. No LAD.   CVS: RRR, Normal S1,S2, No MRG.  PULM: CTAB      Most Recent Labs:    Lab Results   Component Value Date/Time    WBC 6.5 03/01/2017 03:01 AM    RBC 4.26* 03/01/2017  03:01 AM    HEMOGLOBIN 12.4* 03/01/2017 03:01 AM    HEMATOCRIT 38.8* 03/01/2017 03:01 AM    MCV 91.1 03/01/2017 03:01 AM    MCH 29.1 03/01/2017 03:01 AM    MCHC 32.0* 03/01/2017 03:01 AM    MPV 11.1 03/01/2017 03:01 AM    NEUTROPHILS-POLYS 56.00 03/01/2017 03:01 AM    LYMPHOCYTES 34.20 03/01/2017 03:01 AM    MONOCYTES 7.10 03/01/2017 03:01 AM    EOSINOPHILS 1.40 03/01/2017 03:01 AM    BASOPHILS 0.80 03/01/2017 03:01 AM      Lab Results   Component Value Date/Time    SODIUM 136 03/02/2017 01:42 AM    POTASSIUM 3.7 03/02/2017 01:42 AM    CHLORIDE 105 03/02/2017 01:42 AM    CO2 26 03/02/2017 01:42 AM    GLUCOSE 100* 03/02/2017 01:42 AM    BUN 6* 03/02/2017 01:42 AM    CREATININE 0.71 03/02/2017 01:42 AM      Lab Results   Component Value Date/Time    ALT(SGPT) 98* 03/01/2017 04:58 AM    AST(SGOT) 121* 03/01/2017 04:58 AM    ALKALINE PHOSPHATASE 72 03/01/2017 04:58 AM    TOTAL BILIRUBIN 0.7 03/01/2017 04:58 AM    ALBUMIN 2.6* 03/01/2017 04:58 AM    GLOBULIN 3.8* 03/01/2017 04:58 AM    INR 1.05 02/27/2017 04:45 AM     Lab Results   Component Value Date/Time    PT 14.0 02/27/2017 04:45 AM    INR 1.05 02/27/2017 04:45 AM

## 2017-03-04 LAB
BACTERIA BLD CULT: NORMAL
SIGNIFICANT IND 70042: NORMAL
SITE SITE: NORMAL
SOURCE SOURCE: NORMAL

## 2017-03-23 NOTE — ADDENDUM NOTE
Encounter addended by: Pamela Tyson R.N. on: 3/23/2017  7:38 AM<BR>     Documentation filed: Inpatient Document Flowsheet